# Patient Record
Sex: FEMALE | Race: WHITE | Employment: OTHER | URBAN - METROPOLITAN AREA
[De-identification: names, ages, dates, MRNs, and addresses within clinical notes are randomized per-mention and may not be internally consistent; named-entity substitution may affect disease eponyms.]

---

## 2017-01-02 ENCOUNTER — ONCOLOGY VISIT (OUTPATIENT)
Dept: ONCOLOGY | Facility: CLINIC | Age: 72
End: 2017-01-02
Attending: INTERNAL MEDICINE
Payer: MEDICARE

## 2017-01-02 VITALS
OXYGEN SATURATION: 100 % | DIASTOLIC BLOOD PRESSURE: 73 MMHG | TEMPERATURE: 97 F | SYSTOLIC BLOOD PRESSURE: 167 MMHG | WEIGHT: 113 LBS | RESPIRATION RATE: 16 BRPM | HEIGHT: 65 IN | HEART RATE: 74 BPM | BODY MASS INDEX: 18.83 KG/M2

## 2017-01-02 DIAGNOSIS — C90.00 MULTIPLE MYELOMA, REMISSION STATUS UNSPECIFIED (H): Primary | ICD-10-CM

## 2017-01-02 DIAGNOSIS — D47.2 MONOCLONAL GAMMOPATHY: ICD-10-CM

## 2017-01-02 PROCEDURE — 99211 OFF/OP EST MAY X REQ PHY/QHP: CPT

## 2017-01-02 PROCEDURE — 99214 OFFICE O/P EST MOD 30 MIN: CPT | Performed by: INTERNAL MEDICINE

## 2017-01-02 ASSESSMENT — PAIN SCALES - GENERAL: PAINLEVEL: NO PAIN (0)

## 2017-01-02 NOTE — PROGRESS NOTES
"Roxanna Castorena is a 71 year old female who presents for:  Chief Complaint   Patient presents with     Oncology Clinic Visit     follow up        Initial Vitals:  There were no vitals taken for this visit. Estimated body mass index is 17.35 kg/(m^2) as calculated from the following:    Height as of 7/11/16: 1.657 m (5' 5.24\").    Weight as of 11/18/16: 47.628 kg (105 lb).. There is no height or weight on file to calculate BSA. BP completed using cuff size: regular  Data Unavailable No LMP recorded. Patient has had a hysterectomy. Allergies and medications reviewed.     Medications: Medication refills not needed today.  Pharmacy [facility] entered into [email or web address]: [facility] [address] [place] [address] [address] AT [place] [address] & [facility]    Comments: Follow up    8 minutes for nursing intake (face to face time)   Amy Shepard CMA     DISCHARGE PLAN:  Next appointments: See patient instruction section  Departure Mode: Ambulatory  Accompanied by:   0 minutes for nursing discharge (face to face time)   Amy Shepard CMA              "

## 2017-01-02 NOTE — PROGRESS NOTES
"[facility] PHYSICIANS  HEMATOLOGY ONCOLOGY    DIAGNOSES:   1.  Monoclonal gammopathy of unknown significance.  Bone marrow biopsy 10/09/2014 with less than 5% plasma cells with a portion that are polytypic for kappa and lambda, majority stain for lambda, mildly hypocellular marrow with trilineage hematopoiesis, no increase in blasts, overall consistent with subpopulation of lambda-restricted plasma cells in a polyclonal background.   2.  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 She has been at her baseline, continues to have memory issues.     REVIEW OF SYSTEMS:  A complete review of systems was performed and found to be negative other than pertinent positives mentioned in the history of present illness.      Past medical, social histories reviewed.    Meds- Reviewed.    PHYSICAL EXAM:   /73 mmHg  Pulse 74  Temp(Src) 97  F (36.1  C) (Oral)  Resp 16  Ht 1.657 m (5' 5.24\")  Wt 51.256 kg (113 lb)  BMI 18.67 kg/m2  SpO2 100%  GENERAL:  Sitting comfortably.   HEENT:  Pupils equal.  Oral mucosa normal.   NECK:  No supraclavicular, submandibular or cervical lymphadenopathy.   HEART:  S1, S2, regular.   LUNGS:  Bilaterally clear to auscultation.   ABDOMEN:  Soft, nontender.  No hepatosplenomegaly.   EXTREMITIES:  No dependent edema.   SKIN:  No rash.   NEUROLOGIC:  Alert, awake, with a component of cognitive impairment.        DATA:  Recent Labs   Lab Test  12/30/16   1345  07/06/16   1315   NA  145*  140   POTASSIUM  4.0  4.0   CHLORIDE  107  105   CO2  30  28   ANIONGAP  8  7   BUN  25  25   CR  0.94  0.96   GLC  77  99   PRINCESS  9.0  8.5     Recent Labs   Lab Test  12/30/16   1345  09/16/16   1131  07/06/16   1315   WBC  5.0  6.0  4.2   HGB  11.8  10.8*  10.2*   PLT  154  145*  132*   MCV  109*  110*  110*   NEUTROPHIL  66.1  73.5  67.3     Recent Labs   Lab " Test  12/30/16   1345  07/06/16   1315  01/18/16   1249   BILITOTAL  0.5  0.6  0.5   ALKPHOS  83  64  79   ALT  15  9  13   AST  24  13  18   ALBUMIN  3.8  3.5  4.1     Results for SAUL ESPARZA (MRN [id number]) as of 1/2/2017 15:03   Ref. Range 7/6/2016 13:15 12/30/2016 13:45   Gamma Fraction Latest Ref Range: 0.7-1.6 g/dL 0.8 0.8   IGA Latest Ref Range:  mg/dL 144 Pending   IGG Latest Ref Range: 695-1620 mg/dL 921 Pending   IGM Latest Ref Range:  mg/dL 53 (L) Pending   Immunofixation ELP Unknown (Note)... (Note)...   Kappa Free Lt Chain Latest Ref Range: 0.33-1.94 mg/dL 1.21    Kappa Lambda Ratio Latest Ref Range: 0.26-1.65  0.64    Lambda Free Lt Chain Latest Ref Range: 0.57-2.63 mg/dL 1.88    Monoclonal Peak Latest Ref Range: 0.0 g/dL 0.3 (H) 0.3 (H)       Results for orders placed or performed during the hospital encounter of 12/30/16   XR Bone Survey Complete    Narrative    XR BONE SURVEY COMPLETE  12/30/2016 1:30 PM    HISTORY:  Monoclonal gammopathy    COMPARISON:  1/18/2016      Impression    IMPRESSION:  Possible tiny lytic lesion in the skull. 3.5 cm x 1.5  lucent lesion in the left intertrochanteric may be lytic. Possible  small lytic lesions in the proximal humerus, not all confirmed on  every view. Otherwise negative. Findings could reflect multiple  myeloma.    JONATHAN GIRALDO MD       ECOG performance status 1.      ASSESSMENT:     This is a 69-year-old lady who has a monoclonal gammopathy of unknown significance without any evidence of end organ damage on initial evaluation. Bone marrow biopsy showed no evidence of myeloma.  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 Will continue close observation.  - Bone survey 12/30/16 is showing new bone lesion which may represent progression of MGUS to multiple myeloma.   - Paraproteinemia labs are pending at this point except for stable M spike of 0.3 grams. Otherwise, normal kidney function, calcium level and  stable counts.   - I think further evaluation of skeletal lesions is a reasonable approach. I will have her get a PET CT scan.      PLAN:    1- RTC MD 6 months   2- Labs 3-4 days prior to next visit- CBC diff, CMP  SPEP, Serum IFXN. Light chain assay  3- PET scan in next 1-2 weeks  RANDI MENDES MD    1/2/2017

## 2017-01-02 NOTE — PATIENT INSTRUCTIONS
1- RTC MD 6 months - July 17th @ 1:00/Amanda  2- Labs 3-4 days prior to next visit- CBC diff, CMP  SPEP, Serum IFXN. Light chain assay- July 12th @ 1:00/Amanda  3- PET scan in next 1-2 weeks- Scheduled for Jan 9th @ 1:15/[facility].  Instruction for test and directions to facility given.  Amanda    AVS printed for pt.  Amanda

## 2017-01-14 ENCOUNTER — HOSPITAL ENCOUNTER (OUTPATIENT)
Dept: PET IMAGING | Facility: CLINIC | Age: 72
Discharge: HOME | End: 2017-01-14
Attending: INTERNAL MEDICINE | Admitting: INTERNAL MEDICINE
Payer: MEDICARE

## 2017-01-14 DIAGNOSIS — C90.00 MULTIPLE MYELOMA, REMISSION STATUS UNSPECIFIED (H): ICD-10-CM

## 2017-01-14 LAB — GLUCOSE BLDC GLUCOMTR-MCNC: 126 MG/DL (ref 70–99)

## 2017-01-14 PROCEDURE — 25500064 ZZH RX 255 OP 636: Performed by: INTERNAL MEDICINE

## 2017-01-14 PROCEDURE — A9552 F18 FDG: HCPCS | Performed by: INTERNAL MEDICINE

## 2017-01-14 PROCEDURE — 71260 CT THORAX DX C+: CPT

## 2017-01-14 PROCEDURE — 34300033 ZZH RX 343: Performed by: INTERNAL MEDICINE

## 2017-01-14 PROCEDURE — 74177 CT ABD & PELVIS W/CONTRAST: CPT

## 2017-01-14 PROCEDURE — 82962 GLUCOSE BLOOD TEST: CPT

## 2017-01-14 RX ORDER — IOPAMIDOL 755 MG/ML
20-135 INJECTION, SOLUTION INTRAVASCULAR ONCE
Status: COMPLETED | OUTPATIENT
Start: 2017-01-14 | End: 2017-01-14

## 2017-01-14 RX ADMIN — IOPAMIDOL 61 ML: 755 INJECTION, SOLUTION INTRAVENOUS at 12:00

## 2017-01-14 RX ADMIN — FLUDEOXYGLUCOSE F-18 10.24 MCI.: 500 INJECTION, SOLUTION INTRAVENOUS at 12:00

## 2017-02-08 ENCOUNTER — TRANSFERRED RECORDS (OUTPATIENT)
Dept: HEALTH INFORMATION MANAGEMENT | Facility: CLINIC | Age: 72
End: 2017-02-08

## 2017-05-16 ENCOUNTER — TELEPHONE (OUTPATIENT)
Dept: NURSING | Facility: CLINIC | Age: 72
End: 2017-05-16

## 2017-05-16 ENCOUNTER — OFFICE VISIT (OUTPATIENT)
Dept: INTERNAL MEDICINE | Facility: CLINIC | Age: 72
End: 2017-05-16
Payer: MEDICARE

## 2017-05-16 VITALS
HEIGHT: 65 IN | BODY MASS INDEX: 19.04 KG/M2 | TEMPERATURE: 97.5 F | WEIGHT: 114.3 LBS | DIASTOLIC BLOOD PRESSURE: 80 MMHG | OXYGEN SATURATION: 100 % | SYSTOLIC BLOOD PRESSURE: 140 MMHG | HEART RATE: 59 BPM

## 2017-05-16 DIAGNOSIS — M25.473 ANKLE EDEMA: Primary | ICD-10-CM

## 2017-05-16 LAB
ALBUMIN SERPL-MCNC: 3.5 G/DL (ref 3.4–5)
ALP SERPL-CCNC: 69 U/L (ref 40–150)
ALT SERPL W P-5'-P-CCNC: 19 U/L (ref 0–50)
ANION GAP SERPL CALCULATED.3IONS-SCNC: 7 MMOL/L (ref 3–14)
AST SERPL W P-5'-P-CCNC: 15 U/L (ref 0–45)
BILIRUB SERPL-MCNC: 0.5 MG/DL (ref 0.2–1.3)
BUN SERPL-MCNC: 29 MG/DL (ref 7–30)
CALCIUM SERPL-MCNC: 9 MG/DL (ref 8.5–10.1)
CHLORIDE SERPL-SCNC: 112 MMOL/L (ref 94–109)
CO2 SERPL-SCNC: 27 MMOL/L (ref 20–32)
CREAT SERPL-MCNC: 1.07 MG/DL (ref 0.52–1.04)
ERYTHROCYTE [DISTWIDTH] IN BLOOD BY AUTOMATED COUNT: 12.8 % (ref 10–15)
GFR SERPL CREATININE-BSD FRML MDRD: 50 ML/MIN/1.7M2
GLUCOSE SERPL-MCNC: 94 MG/DL (ref 70–99)
HCT VFR BLD AUTO: 34 % (ref 35–47)
HGB BLD-MCNC: 11 G/DL (ref 11.7–15.7)
MCH RBC QN AUTO: 35.1 PG (ref 26.5–33)
MCHC RBC AUTO-ENTMCNC: 32.4 G/DL (ref 31.5–36.5)
MCV RBC AUTO: 109 FL (ref 78–100)
PLATELET # BLD AUTO: 148 10E9/L (ref 150–450)
POTASSIUM SERPL-SCNC: 4.3 MMOL/L (ref 3.4–5.3)
PROT SERPL-MCNC: 6.7 G/DL (ref 6.8–8.8)
RBC # BLD AUTO: 3.13 10E12/L (ref 3.8–5.2)
SODIUM SERPL-SCNC: 146 MMOL/L (ref 133–144)
WBC # BLD AUTO: 5 10E9/L (ref 4–11)

## 2017-05-16 PROCEDURE — 80053 COMPREHEN METABOLIC PANEL: CPT | Performed by: INTERNAL MEDICINE

## 2017-05-16 PROCEDURE — 85027 COMPLETE CBC AUTOMATED: CPT | Performed by: INTERNAL MEDICINE

## 2017-05-16 PROCEDURE — 99214 OFFICE O/P EST MOD 30 MIN: CPT | Performed by: INTERNAL MEDICINE

## 2017-05-16 PROCEDURE — 36415 COLL VENOUS BLD VENIPUNCTURE: CPT | Performed by: INTERNAL MEDICINE

## 2017-05-16 NOTE — TELEPHONE ENCOUNTER
Patient's spouse calling.  Reporting over the last week patient has developed mild swelling in bilateral ankles.  Denies pain.  Swelling has not inhibited patients ability to ambulate.  Denies any difficulty breathing.  No recent medication changes.  Requesting to be seen, made appointment for today 5/16/17.

## 2017-05-16 NOTE — LETTER
[address] [id number] [phone]      05/17/17      Roxanna Castorena  [address] [place] [address]        Dear ,    It was a pleasure meeting you the other day! I hope this finds you well.    I wanted to let you know that your labs came back as stable from prior labs - no significant change in kidney function or blood counts.    Your leg ultrasound was negative for blood clots. It did show small, benign cysts behind both knees. These are nothing to worry about (and there's nothing to do for them).    Your mild ankle swelling is likely due to venous insufficiency - which is a benign condition (slowed venous blood return to heart from legs). This is best treated with leg elevation when sitting or lying down.    If your swelling worsens, please let me or Dr. Jeffrey know.     Please feel free to contact me with any questions or concerns.      Take care,    Ros Garza MD   [address] [address]  T: [phone], F: [phone]

## 2017-05-16 NOTE — MR AVS SNAPSHOT
After Visit Summary   5/16/2017    Roxanna Castorena    MRN: [id number]           Patient Information     Date Of Birth          1945        Visit Information        Provider Department      5/16/2017 2:30 PM Ros Garza MD [facility]        Today's Diagnoses     Bilateral lower extremity edema    -  1      Care Instructions    Please schedule leg ultrasounds on the way out.     ---    Labs - please proceed to our first floor laboratory to have these drawn (show them your orange ticket).     Results:    If normal: we will release results in MyChart or send them in the mail. You will not be called for these results.    If abnormal, but non-urgent: we will release results in MyChart or send them in the mail. You will not be called for these results.    If abnormal and urgent: we will call you.    ---    Keep legs elevated when seated and lying down.     ---              Follow-ups after your visit        Your next 10 appointments already scheduled     Jul 11, 2017  9:00 AM CDT   Return Visit with  ONCOLOGY NURSE   [facility] ([facility])    [facility]  [address]  [facility] [address]   [phone]            Jul 17, 2017  1:00 PM CDT   Return Visit with Amber Villagran MD   [facility] ([facility])    [facility]  [address]  [facility] [address]   [phone]              Future tests that were ordered for you today     Open Future Orders        Priority Expected Expires Ordered    US Lower Extremity Venous Duplex Bilateral Routine  5/16/2018 5/16/2017            Who to contact     If you have questions or need follow up information about today's clinic visit or your schedule please contact [facility] directly at [phone].  Normal or non-critical lab and imaging results will be  "communicated to you by MyChart, letter or phone within 4 business days after the clinic has received the results. If you do not hear from us within 7 days, please contact the clinic through Qwicklyt or phone. If you have a critical or abnormal lab result, we will notify you by phone as soon as possible.  Submit refill requests through Pockit or call your pharmacy and they will forward the refill request to us. Please allow 3 business days for your refill to be completed.          Additional Information About Your Visit        Pockit Information     Pockit lets you send messages to your doctor, view your test results, renew your prescriptions, schedule appointments and more. To sign up, go to [email or web address] . Click on \"Log in\" on the left side of the screen, which will take you to the Welcome page. Then click on \"Sign up Now\" on the right side of the page.     You will be asked to enter the access code listed below, as well as some personal information. Please follow the directions to create your username and password.     Your access code is: [email or web address]  Expires: 2017  2:52 PM     Your access code will  in 90 days. If you need help or a new code, please call your [place] clinic or [phone].        Care EveryWhere ID     This is your Care EveryWhere ID. This could be used by other organizations to access your [place] medical records  [id number]        Your Vitals Were     Pulse Temperature Height Pulse Oximetry BMI (Body Mass Index)       59 97.5  F (36.4  C) (Oral) 5' 5\" (1.651 m) 100% 19.02 kg/m2        Blood Pressure from Last 3 Encounters:   17 140/80   17 167/73   16 130/72    Weight from Last 3 Encounters:   17 114 lb 4.8 oz (51.8 kg)   17 113 lb (51.3 kg)   16 105 lb (47.6 kg)              We Performed the Following     CBC with platelets     Comprehensive metabolic panel        Primary Care Provider Office Phone # Fax #    Lui Jeffrey MD " [phone]       [facility] [address]  [facility] [address]        Thank you!     Thank you for choosing [facility]  for your care. Our goal is always to provide you with excellent care. Hearing back from our patients is one way we can continue to improve our services. Please take a few minutes to complete the written survey that you may receive in the mail after your visit with us. Thank you!             Your Updated Medication List - Protect others around you: Learn how to safely use, store and throw away your medicines at www.disposemymeds.org.          This list is accurate as of: 5/16/17  2:52 PM.  Always use your most recent med list.                   Brand Name Dispense Instructions for use    carbidopa-levodopa  MG per tablet    SINEMET     Take 1 tablet by mouth 3 times daily       EXELON 4.6 MG/24HR 24 hr patch   Generic drug:  rivastigmine      Place 1 patch onto the skin daily Prescribed by Neurology       gabapentin 100 MG capsule    NEURONTIN    90 capsule    Take 1 capsule (100 mg) by mouth 3 times daily As needed for  Facial nerve pain       lisinopril 10 MG tablet    PRINIVIL/ZESTRIL    90 tablet    Take 1 tablet (10 mg) by mouth daily       memantine 10 MG tablet    NAMENDA    180 tablet    Take 1 tablet (10 mg) by mouth 2 times daily       propranolol 10 MG tablet    INDERAL    180 tablet    Take 1 tablet (10 mg) by mouth 2 times daily

## 2017-05-16 NOTE — NURSING NOTE
"Chief Complaint   Patient presents with     Swelling     x 2 weeks. Both ankles.       Initial /80 (BP Location: Left arm, Patient Position: Chair, Cuff Size: Adult Regular)  Pulse 59  Temp 97.5  F (36.4  C) (Oral)  Ht 5' 5\" (1.651 m)  Wt 114 lb 4.8 oz (51.8 kg)  SpO2 100%  BMI 19.02 kg/m2 Estimated body mass index is 19.02 kg/(m^2) as calculated from the following:    Height as of this encounter: 5' 5\" (1.651 m).    Weight as of this encounter: 114 lb 4.8 oz (51.8 kg).  Medication Reconciliation: complete       Kaminibose MA      "

## 2017-05-16 NOTE — PATIENT INSTRUCTIONS
Please schedule leg ultrasounds on the way out.     ---    Labs - please proceed to our first floor laboratory to have these drawn (show them your orange ticket).     Results:    If normal: we will release results in MyChart or send them in the mail. You will not be called for these results.    If abnormal, but non-urgent: we will release results in MyChart or send them in the mail. You will not be called for these results.    If abnormal and urgent: we will call you.    ---    Keep legs elevated when seated and lying down.     ---

## 2017-05-16 NOTE — PROGRESS NOTES
"  SUBJECTIVE:                                                      HPI: Roxanna Castorena is a pleasant 71 year old female who presents with ankle swelling:    Accompanied by , who provides the majority of history (patient has Alzheimer's).    - ongoing for ~1 month  - bilateral, L>R    - present all day, even upon waking  - does not worsen over the course of the day    - no associated discomfort or skin changes  - no chest pain or palpitations  - no shortness of breath or cough  - no lightheadedness or presyncope  - no fevers or chills    - no recent trauma, surgeries, or mobilization; patient is moderately active during the day (not sedentary)  - no new medications prescription or over-the-counter    PMH significant for CKD stage III multiple myeloma.    The medication, allergy, and problem lists have been reviewed and updated as appropriate.       OBJECTIVE:                                                      /80 (BP Location: Left arm, Patient Position: Chair, Cuff Size: Adult Regular)  Pulse 59  Temp 97.5  F (36.4  C) (Oral)  Ht 5' 5\" (1.651 m)  Wt 114 lb 4.8 oz (51.8 kg)  SpO2 100%  BMI 19.02 kg/m2  Constitutional: well-appearing  Respiratory: poor respiratory effort; clear to auscultation bilaterally  Cardiovascular: regular rate and rhythm; mild, nonpitting edema bilaterally, L>R  Musculoskeletal: normal gait and station  Psych: flattened affect; minimally but appropriately interactive       ASSESSMENT/PLAN:                                                      (M24.398) Ankle edema  (primary encounter diagnosis)  Comment: suspect mild venous insufficiency, but worsening renal function (possibly due to MM) is also on differential.  Plan:    - acute feet elevated when seated or lying down.    - CBC and CMP today.   - bilateral lower extremity venous ultrasound to be scheduled.   - if above unrevealing, do not think compression hose necessary (edema is mild and asymptomatic).    The " instructions on the AVS were discussed and explained to the patient. Patient expressed understanding of instructions.    Ros Garza MD   [address] [address]  T: [phone], F: [phone]

## 2017-05-17 ENCOUNTER — TELEPHONE (OUTPATIENT)
Dept: INTERNAL MEDICINE | Facility: CLINIC | Age: 72
End: 2017-05-17

## 2017-05-17 ENCOUNTER — RADIANT APPOINTMENT (OUTPATIENT)
Dept: ULTRASOUND IMAGING | Facility: CLINIC | Age: 72
End: 2017-05-17
Attending: INTERNAL MEDICINE
Payer: MEDICARE

## 2017-05-17 DIAGNOSIS — M25.473 ANKLE EDEMA: ICD-10-CM

## 2017-05-17 PROCEDURE — 93970 EXTREMITY STUDY: CPT

## 2017-07-10 ENCOUNTER — ONCOLOGY VISIT (OUTPATIENT)
Dept: ONCOLOGY | Facility: CLINIC | Age: 72
End: 2017-07-10
Attending: INTERNAL MEDICINE
Payer: MEDICARE

## 2017-07-10 ENCOUNTER — HOSPITAL ENCOUNTER (OUTPATIENT)
Facility: CLINIC | Age: 72
Setting detail: SPECIMEN
Discharge: HOME | End: 2017-07-10
Attending: INTERNAL MEDICINE | Admitting: INTERNAL MEDICINE
Payer: MEDICARE

## 2017-07-10 DIAGNOSIS — D47.2 MONOCLONAL GAMMOPATHY: ICD-10-CM

## 2017-07-10 LAB
ALBUMIN SERPL-MCNC: 3.5 G/DL (ref 3.4–5)
ALP SERPL-CCNC: 101 U/L (ref 40–150)
ALT SERPL W P-5'-P-CCNC: 20 U/L (ref 0–50)
ANION GAP SERPL CALCULATED.3IONS-SCNC: 6 MMOL/L (ref 3–14)
AST SERPL W P-5'-P-CCNC: 21 U/L (ref 0–45)
BASOPHILS # BLD AUTO: 0 10E9/L (ref 0–0.2)
BASOPHILS NFR BLD AUTO: 0.5 %
BILIRUB SERPL-MCNC: 0.5 MG/DL (ref 0.2–1.3)
BUN SERPL-MCNC: 24 MG/DL (ref 7–30)
CALCIUM SERPL-MCNC: 8.9 MG/DL (ref 8.5–10.1)
CHLORIDE SERPL-SCNC: 109 MMOL/L (ref 94–109)
CO2 SERPL-SCNC: 29 MMOL/L (ref 20–32)
CREAT SERPL-MCNC: 1.03 MG/DL (ref 0.52–1.04)
DIFFERENTIAL METHOD BLD: ABNORMAL
EOSINOPHIL # BLD AUTO: 0 10E9/L (ref 0–0.7)
EOSINOPHIL NFR BLD AUTO: 1 %
ERYTHROCYTE [DISTWIDTH] IN BLOOD BY AUTOMATED COUNT: 13.2 % (ref 10–15)
GFR SERPL CREATININE-BSD FRML MDRD: 52 ML/MIN/1.7M2
GLUCOSE SERPL-MCNC: 119 MG/DL (ref 70–99)
HCT VFR BLD AUTO: 34 % (ref 35–47)
HGB BLD-MCNC: 11.3 G/DL (ref 11.7–15.7)
IMM GRANULOCYTES # BLD: 0 10E9/L (ref 0–0.4)
IMM GRANULOCYTES NFR BLD: 0.2 %
LYMPHOCYTES # BLD AUTO: 0.8 10E9/L (ref 0.8–5.3)
LYMPHOCYTES NFR BLD AUTO: 19.9 %
MCH RBC QN AUTO: 36 PG (ref 26.5–33)
MCHC RBC AUTO-ENTMCNC: 33.2 G/DL (ref 31.5–36.5)
MCV RBC AUTO: 108 FL (ref 78–100)
MONOCYTES # BLD AUTO: 0.3 10E9/L (ref 0–1.3)
MONOCYTES NFR BLD AUTO: 6.5 %
NEUTROPHILS # BLD AUTO: 2.9 10E9/L (ref 1.6–8.3)
NEUTROPHILS NFR BLD AUTO: 71.9 %
NRBC # BLD AUTO: 0 10*3/UL
NRBC BLD AUTO-RTO: 0 /100
PLATELET # BLD AUTO: 150 10E9/L (ref 150–450)
POTASSIUM SERPL-SCNC: 3.7 MMOL/L (ref 3.4–5.3)
PROT SERPL-MCNC: 6.7 G/DL (ref 6.8–8.8)
RBC # BLD AUTO: 3.14 10E12/L (ref 3.8–5.2)
SODIUM SERPL-SCNC: 144 MMOL/L (ref 133–144)
WBC # BLD AUTO: 4 10E9/L (ref 4–11)

## 2017-07-10 PROCEDURE — 83883 ASSAY NEPHELOMETRY NOT SPEC: CPT | Performed by: INTERNAL MEDICINE

## 2017-07-10 PROCEDURE — 00000402 ZZHCL STATISTIC TOTAL PROTEIN: Performed by: INTERNAL MEDICINE

## 2017-07-10 PROCEDURE — 84165 PROTEIN E-PHORESIS SERUM: CPT | Performed by: INTERNAL MEDICINE

## 2017-07-10 PROCEDURE — 82784 ASSAY IGA/IGD/IGG/IGM EACH: CPT | Performed by: INTERNAL MEDICINE

## 2017-07-10 PROCEDURE — 85025 COMPLETE CBC W/AUTO DIFF WBC: CPT | Performed by: INTERNAL MEDICINE

## 2017-07-10 PROCEDURE — 80053 COMPREHEN METABOLIC PANEL: CPT | Performed by: INTERNAL MEDICINE

## 2017-07-10 PROCEDURE — 36415 COLL VENOUS BLD VENIPUNCTURE: CPT

## 2017-07-10 PROCEDURE — 86334 IMMUNOFIX E-PHORESIS SERUM: CPT | Performed by: INTERNAL MEDICINE

## 2017-07-10 NOTE — MR AVS SNAPSHOT
"              After Visit Summary   7/10/2017    Roxanna Castorena    MRN: [id number]           Patient Information     Date Of Birth          1945        Visit Information        Provider Department      7/10/2017 9:00 AM  ONCOLOGY NURSE [facility]        Today's Diagnoses     Monoclonal gammopathy           Follow-ups after your visit        Your next 10 appointments already scheduled     Jul 17, 2017  1:00 PM CDT   Return Visit with Amber Villagran MD   [facility] ([facility])    [facility]  [address]  [facility] [id number] [phone]              Who to contact     If you have questions or need follow up information about today's clinic visit or your schedule please contact [facility] directly at [phone].  Normal or non-critical lab and imaging results will be communicated to you by ugichemhart, letter or phone within 4 business days after the clinic has received the results. If you do not hear from us within 7 days, please contact the clinic through MyChart or phone. If you have a critical or abnormal lab result, we will notify you by phone as soon as possible.  Submit refill requests through 8aweek or call your pharmacy and they will forward the refill request to us. Please allow 3 business days for your refill to be completed.          Additional Information About Your Visit        MyChart Information     8aweek lets you send messages to your doctor, view your test results, renew your prescriptions, schedule appointments and more. To sign up, go to [email or web address] . Click on \"Log in\" on the left side of the screen, which will take you to the Welcome page. Then click on \"Sign up Now\" on the right side of the page.     You will be asked to enter the access code listed below, as well as some personal information. Please follow the directions to create your username " and password.     Your access code is: [email or web address]  Expires: 2017  2:52 PM     Your access code will  in 90 days. If you need help or a new code, please call your [place] clinic or [phone].        Care EveryWhere ID     This is your Care EveryWhere ID. This could be used by other organizations to access your [place] medical records  [id number]         Blood Pressure from Last 3 Encounters:   17 140/80   17 167/73   16 130/72    Weight from Last 3 Encounters:   17 51.8 kg (114 lb 4.8 oz)   17 51.3 kg (113 lb)   16 47.6 kg (105 lb)              We Performed the Following     CBC with platelets differential     Comprehensive metabolic panel     Kappa and lambda light chain     Protein electrophoresis     Protein Immunofixation Serum        Primary Care Provider Office Phone # Fax #    Lui Jeffrey [phone]       [facility] [address] [facility] [address]        Equal Access to Services     MAGO RODRIGUEZ : Hadii aad ku hadasho Soomaali, waaxda luqadaha, qaybta kaalmada adekymberlyyaashley, claudia hector. So [facility] [phone].    ATENCIÓN: Si habla español, tiene a nicole disposición servicios gratuitos de asistencia lingüística. Fawad al [phone].    We comply with applicable federal civil rights laws and [place] laws. We do not discriminate on the basis of race, color, national origin, age, disability sex, sexual orientation or gender identity.            Thank you!     Thank you for choosing [facility]  for your care. Our goal is always to provide you with excellent care. Hearing back from our patients is one way we can continue to improve our services. Please take a few minutes to complete the written survey that you may receive in the mail after your visit with us. Thank you!             Your Updated Medication List - Protect others around you: Learn how to safely use, store and  throw away your medicines at www.disposemymeds.org.          This list is accurate as of: 7/10/17  9:27 AM.  Always use your most recent med list.                   Brand Name Dispense Instructions for use Diagnosis    carbidopa-levodopa  MG per tablet    SINEMET     Take 1 tablet by mouth 3 times daily        EXELON 4.6 MG/24HR 24 hr patch   Generic drug:  rivastigmine      Place 1 patch onto the skin daily Prescribed by Neurology    Dementia       gabapentin 100 MG capsule    NEURONTIN    90 capsule    Take 1 capsule (100 mg) by mouth 3 times daily As needed for  Facial nerve pain    Trigeminal neuralgia       lisinopril 10 MG tablet    PRINIVIL/ZESTRIL    90 tablet    Take 1 tablet (10 mg) by mouth daily    Benign essential hypertension       memantine 10 MG tablet    NAMENDA    180 tablet    Take 1 tablet (10 mg) by mouth 2 times daily    Dementia without behavioral disturbance, unspecified dementia type       propranolol 10 MG tablet    INDERAL    180 tablet    Take 1 tablet (10 mg) by mouth 2 times daily    Tremor

## 2017-07-10 NOTE — PROGRESS NOTES
Medical Assistant Note:  Roxanna Castorena presents today for lab draw.    Patient seen by provider today: No.   present during visit today: Not Applicable.    Concerns: No Concerns.    Procedure:  Labs drawn: .    Post Assessment:  Labs drawn without difficulty: Yes.    Discharge Plan:  Departure Mode: Ambulatory.    Face to Face Time: 10.    Amy Shepard

## 2017-07-11 LAB
ALBUMIN SERPL ELPH-MCNC: 3.7 G/DL (ref 3.7–5.1)
ALPHA1 GLOB SERPL ELPH-MCNC: 0.4 G/DL (ref 0.2–0.4)
ALPHA2 GLOB SERPL ELPH-MCNC: 0.8 G/DL (ref 0.5–0.9)
B-GLOBULIN SERPL ELPH-MCNC: 0.8 G/DL (ref 0.6–1)
GAMMA GLOB SERPL ELPH-MCNC: 0.8 G/DL (ref 0.7–1.6)
IGA SERPL-MCNC: 131 MG/DL (ref 70–380)
IGG SERPL-MCNC: 809 MG/DL (ref 695–1620)
IGM SERPL-MCNC: 49 MG/DL (ref 60–265)
IMMUNOFIXATION ELP: ABNORMAL
KAPPA LC UR-MCNC: 1.24 MG/DL (ref 0.33–1.94)
KAPPA LC/LAMBDA SER: 0.52 {RATIO} (ref 0.26–1.65)
LAMBDA LC SERPL-MCNC: 2.4 MG/DL (ref 0.57–2.63)
M PROTEIN SERPL ELPH-MCNC: 0.3 G/DL
PROT PATTERN SERPL ELPH-IMP: ABNORMAL

## 2017-07-17 ENCOUNTER — ONCOLOGY VISIT (OUTPATIENT)
Dept: ONCOLOGY | Facility: CLINIC | Age: 72
End: 2017-07-17
Attending: INTERNAL MEDICINE
Payer: MEDICARE

## 2017-07-17 VITALS
WEIGHT: 118 LBS | OXYGEN SATURATION: 99 % | HEART RATE: 71 BPM | DIASTOLIC BLOOD PRESSURE: 71 MMHG | TEMPERATURE: 96.6 F | SYSTOLIC BLOOD PRESSURE: 153 MMHG | HEIGHT: 65 IN | BODY MASS INDEX: 19.66 KG/M2 | RESPIRATION RATE: 16 BRPM

## 2017-07-17 DIAGNOSIS — D47.2 MGUS (MONOCLONAL GAMMOPATHY OF UNKNOWN SIGNIFICANCE): Primary | ICD-10-CM

## 2017-07-17 PROCEDURE — 99211 OFF/OP EST MAY X REQ PHY/QHP: CPT

## 2017-07-17 PROCEDURE — 99213 OFFICE O/P EST LOW 20 MIN: CPT | Performed by: INTERNAL MEDICINE

## 2017-07-17 ASSESSMENT — PAIN SCALES - GENERAL: PAINLEVEL: NO PAIN (0)

## 2017-07-17 NOTE — MR AVS SNAPSHOT
After Visit Summary   7/17/2017    Roxanna Castorena    MRN: [id number]           Patient Information     Date Of Birth          1945        Visit Information        Provider Department      7/17/2017 1:00 PM Amber Villagran MD [facility]  Oncology [facility]      Today's Diagnoses     MGUS (monoclonal gammopathy of unknown significance)    -  1      Care Instructions    1- RTC MD 6 months Scheduled  Marcia MILLER  2- Labs 3-4 days prior to next visit- CBC diff, CMP  SPEP, Serum IFXN. Light chain assay Scheduled  Marcia MILLER    AVS given to patient          Follow-ups after your visit        Your next 10 appointments already scheduled     Jan 08, 2018  1:00 PM CST   Return Visit with  ONCOLOGY NURSE   [facility] ([facility])    [facility]  [address]  [facility] [id number] [phone]            Marquis 15, 2018  2:00 PM CST   Return Visit with Amber Villagran MD   [facility] ([facility])    [facility]  [address]  [facility] [address] [phone]              Future tests that were ordered for you today     Open Future Orders        Priority Expected Expires Ordered    Protein electrophoresis Routine 1/17/2018 7/17/2018 7/17/2017    Kappa and lambda light chain Routine 1/17/2018 7/17/2018 7/17/2017    Comprehensive metabolic panel Routine 1/17/2018 7/17/2018 7/17/2017    CBC with platelets differential Routine 1/17/2018 7/17/2018 7/17/2017    Protein Immunofixation Serum Routine 1/17/2018 7/17/2018 7/17/2017            Who to contact     If you have questions or need follow up information about today's clinic visit or your schedule please contact [facility] directly at [phone].  Normal or non-critical lab and imaging results will be communicated to you by MyChart, letter or phone within 4 business days  "after the clinic has received the results. If you do not hear from us within 7 days, please contact the clinic through [email or web address] or phone. If you have a critical or abnormal lab result, we will notify you by phone as soon as possible.  Submit refill requests through [email or web address] or call your pharmacy and they will forward the refill request to us. Please allow 3 business days for your refill to be completed.          Additional Information About Your Visit        [email or web address] Information     [email or web address] lets you send messages to your doctor, view your test results, renew your prescriptions, schedule appointments and more. To sign up, go to [email or web address] . Click on \"Log in\" on the left side of the screen, which will take you to the Welcome page. Then click on \"Sign up Now\" on the right side of the page.     You will be asked to enter the access code listed below, as well as some personal information. Please follow the directions to create your username and password.     Your access code is: [email or web address]  Expires: 2017  2:52 PM     Your access code will  in 90 days. If you need help or a new code, please call your [place] clinic or [phone].        Care EveryWhere ID     This is your Care EveryWhere ID. This could be used by other organizations to access your [place] medical records  [id number]        Your Vitals Were     Pulse Temperature Respirations Height Pulse Oximetry BMI (Body Mass Index)    71 96.6  F (35.9  C) (Oral) 16 1.651 m (5' 5\") 99% 19.64 kg/m2       Blood Pressure from Last 3 Encounters:   17 153/71   17 140/80   17 167/73    Weight from Last 3 Encounters:   17 53.5 kg (118 lb)   17 51.8 kg (114 lb 4.8 oz)   17 51.3 kg (113 lb)               Primary Care Provider Office Phone # Fax #    Lui Jeffrey [phone]       [facility] [address] [facility] [address]        Equal Access to Services     MAGO RODRIGUEZ AH: Lane petersen " palmer Jones, waaxda luqadaha, qaybta kaalmada rubén, claudia urbanoin hayaan avrilkymberly cooper ladavidqian tawny. So [facility] [place] [phone].    ATENCIÓN: Si matiasla pretty, tiene a nicole disposición servicios gratuitos de asistencia lingüística. Fawad al [phone].    We comply with applicable federal civil rights laws and [place] laws. We do not discriminate on the basis of race, color, national origin, age, disability sex, sexual orientation or gender identity.            Thank you!     Thank you for choosing [facility]  for your care. Our goal is always to provide you with excellent care. Hearing back from our patients is one way we can continue to improve our services. Please take a few minutes to complete the written survey that you may receive in the mail after your visit with us. Thank you!             Your Updated Medication List - Protect others around you: Learn how to safely use, store and throw away your medicines at www.disposemymeds.org.          This list is accurate as of: 7/17/17  1:31 PM.  Always use your most recent med list.                   Brand Name Dispense Instructions for use Diagnosis    carbidopa-levodopa  MG per tablet    SINEMET     Take 1 tablet by mouth 3 times daily        EXELON 4.6 MG/24HR 24 hr patch   Generic drug:  rivastigmine      Place 1 patch onto the skin daily Prescribed by Neurology    Dementia       gabapentin 100 MG capsule    NEURONTIN    90 capsule    Take 1 capsule (100 mg) by mouth 3 times daily As needed for  Facial nerve pain    Trigeminal neuralgia       lisinopril 10 MG tablet    PRINIVIL/ZESTRIL    90 tablet    Take 1 tablet (10 mg) by mouth daily    Benign essential hypertension       memantine 10 MG tablet    NAMENDA    180 tablet    Take 1 tablet (10 mg) by mouth 2 times daily    Dementia without behavioral disturbance, unspecified dementia type       propranolol 10 MG tablet    INDERAL    180 tablet    Take 1 tablet (10 mg) by mouth 2  times daily    Tremor

## 2017-07-17 NOTE — NURSING NOTE
"Oncology Rooming Note    July 17, 2017 1:04 PM   Roxanna Castorena is a 71 year old female who presents for:    Chief Complaint   Patient presents with     Oncology Clinic Visit     Follow up      Initial Vitals: /71 (BP Location: Right arm, Patient Position: Chair, Cuff Size: Adult Regular)  Pulse 71  Temp 96.6  F (35.9  C) (Oral)  Resp 16  Ht 1.651 m (5' 5\")  Wt 53.5 kg (118 lb)  SpO2 99%  BMI 19.64 kg/m2 Estimated body mass index is 19.64 kg/(m^2) as calculated from the following:    Height as of this encounter: 1.651 m (5' 5\").    Weight as of this encounter: 53.5 kg (118 lb). Body surface area is 1.57 meters squared.  No Pain (0) Comment: Data Unavailable   No LMP recorded. Patient has had a hysterectomy.  Allergies reviewed: Yes  Medications reviewed: Yes    Medications: Medication refills not needed today.  Pharmacy [facility] entered into Healthcare IT: [facility] [address] [place] [address] [address] AT [place] [address] & [facility]    Clinical concerns: Follow up     8 minutes for nursing intake (face to face time)     Becky Pompa CMA     DISCHARGE PLAN:  Next appointments: See patient instruction section  Departure Mode: Ambulatory  Accompanied by:   0 minutes for nursing discharge (face to face time)   Becky Pompa CMA                "

## 2017-07-17 NOTE — PROGRESS NOTES
"[facility] PHYSICIANS  HEMATOLOGY ONCOLOGY    DIAGNOSES:   1.  Monoclonal gammopathy of unknown significance.  Bone marrow biopsy 10/09/2014 with less than 5% plasma cells with a portion that are polytypic for kappa and lambda, majority stain for lambda, mildly hypocellular marrow with trilineage hematopoiesis, no increase in blasts, overall consistent with subpopulation of lambda-restricted plasma cells in a polyclonal background.   2.  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 She has been having issues with worsening dementia. No new complaints otherwise.      REVIEW OF SYSTEMS:  A complete review of systems was performed and found to be negative other than pertinent positives mentioned in the history of present illness.      Past medical, social histories reviewed.    Meds- Reviewed.    PHYSICAL EXAM:   /71 (BP Location: Right arm, Patient Position: Chair, Cuff Size: Adult Regular)  Pulse 71  Temp 96.6  F (35.9  C) (Oral)  Resp 16  Ht 1.651 m (5' 5\")  Wt 53.5 kg (118 lb)  SpO2 99%  BMI 19.64 kg/m2  CONSTITUTIONAL: Sitting comfortably.   HEENT: Pupils are equal. Oropharynx is clear.   NECK: No cervical or supraclavicular lymphadenopathy.   RESPIRATORY: Clear bilaterally.   CARD/VASC: S1, S2, regular.   GI: Soft, nontender, nondistended, no hepatosplenomegaly.   MUSKULOSKELETAL: Warm, well perfused.   NEUROLOGIC: Alert, awake.   INTEGUMENT: No rash.   LYMPHATICS: No edema.   PSYCH: Mood and affect was normal.      LABORATORY DATA AND IMAGING REVIEWED DURING THIS VISIT:  Recent Labs   Lab Test  07/10/17   0910  05/16/17   1502   NA  144  146*   POTASSIUM  3.7  4.3   CHLORIDE  109  112*   CO2  29  27   ANIONGAP  6  7   BUN  24  29   CR  1.03  1.07*   GLC  119*  94   PRINCESS  8.9  9.0     Recent Labs   Lab Test  07/10/17   0910  05/16/17   1502  12/30/16   " 1345  09/16/16   1131   WBC  4.0  5.0  5.0  6.0   HGB  11.3*  11.0*  11.8  10.8*   PLT  150  148*  154  145*   MCV  108*  109*  109*  110*   NEUTROPHIL  71.9   --   66.1  73.5     Recent Labs   Lab Test  07/10/17   0910  05/16/17   1502  12/30/16   1345   BILITOTAL  0.5  0.5  0.5   ALKPHOS  101  69  83   ALT  20  19  15   AST  21  15  24   ALBUMIN  3.5  3.5  3.8   Results for SAUL ESPARZA (MRN [id number]) as of 7/17/2017 13:24   Ref. Range 7/6/2016 13:15 12/30/2016 13:45 7/10/2017 09:10   Gamma Fraction Latest Ref Range: 0.7 - 1.6 g/dL 0.8 0.8 0.8   IGA Latest Ref Range: 70 - 380 mg/dL 144 149 131   IGG Latest Ref Range: 695 - 1620 mg/dL 921 1000 809   IGM Latest Ref Range: 60 - 265 mg/dL 53 (L) 59 (L) 49 (L)   Immunofixation ELP Unknown (Note)... (Note)... (Note)...   Kappa Free Lt Chain Latest Ref Range: 0.33 - 1.94 mg/dL 1.21 1.23 1.24   Kappa Lambda Ratio Latest Ref Range: 0.26 - 1.65  0.64 0.48 0.52   Lambda Free Lt Chain Latest Ref Range: 0.57 - 2.63 mg/dL 1.88 2.57 2.40   Monoclonal Peak Latest Ref Range: 0.0 g/dL 0.3 (H) 0.3 (H) 0.3 (H)     ECOG performance status 1.      ASSESSMENT:     This is a 69-year-old lady who has a monoclonal gammopathy of unknown significance without any evidence of end organ damage on initial evaluation. Bone marrow biopsy showed no evidence of myeloma.  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   Bone survey 12/30/16 is showing new bone lesion which may represent progression of MGUS to multiple myeloma. This was further assessed by a PET scan which was negative.   - Labs were reviewed with the patient, stable hemoglobin and M spike. We will continue observation.        PLAN:    1- RTC MD 6 months   2- Labs 3-4 days prior to next visit- CBC diff, CMP  SPEP, Serum IFXN. Light chain assay    RANDI MENDES MD    7/17/2017

## 2017-07-17 NOTE — PATIENT INSTRUCTIONS
1- RTC MD 6 months Scheduled  Marcia MILLER  2- Labs 3-4 days prior to next visit- CBC diff, CMP  SPEP, Serum IFXN. Light chain assay Scheduled  Marcia MILLER    AVS given to patient

## 2017-07-28 ENCOUNTER — HOSPITAL ENCOUNTER (OUTPATIENT)
Facility: CLINIC | Age: 72
Setting detail: OBSERVATION
Discharge: HOME | End: 2017-07-29
Attending: EMERGENCY MEDICINE | Admitting: INTERNAL MEDICINE
Payer: MEDICARE

## 2017-07-28 ENCOUNTER — APPOINTMENT (OUTPATIENT)
Dept: CT IMAGING | Facility: CLINIC | Age: 72
End: 2017-07-28
Attending: EMERGENCY MEDICINE
Payer: MEDICARE

## 2017-07-28 DIAGNOSIS — G20.A1 DEMENTIA DUE TO PARKINSON'S DISEASE WITHOUT BEHAVIORAL DISTURBANCE (H): Primary | ICD-10-CM

## 2017-07-28 DIAGNOSIS — M62.81 GENERALIZED MUSCLE WEAKNESS: ICD-10-CM

## 2017-07-28 DIAGNOSIS — F02.80 DEMENTIA DUE TO PARKINSON'S DISEASE WITHOUT BEHAVIORAL DISTURBANCE (H): Primary | ICD-10-CM

## 2017-07-28 DIAGNOSIS — S09.90XA CLOSED HEAD INJURY, INITIAL ENCOUNTER: ICD-10-CM

## 2017-07-28 DIAGNOSIS — R26.81 GAIT INSTABILITY: ICD-10-CM
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
## 2017-07-28 LAB
ANION GAP SERPL CALCULATED.3IONS-SCNC: 3 MMOL/L (ref 3–14)
BASOPHILS # BLD AUTO: 0 10E9/L (ref 0–0.2)
BASOPHILS NFR BLD AUTO: 0.2 %
BUN SERPL-MCNC: 21 MG/DL (ref 7–30)
CALCIUM SERPL-MCNC: 8.8 MG/DL (ref 8.5–10.1)
CHLORIDE SERPL-SCNC: 111 MMOL/L (ref 94–109)
CO2 BLDCOV-SCNC: 30 MMOL/L (ref 21–28)
CO2 SERPL-SCNC: 31 MMOL/L (ref 20–32)
CREAT SERPL-MCNC: 1.04 MG/DL (ref 0.52–1.04)
DIFFERENTIAL METHOD BLD: ABNORMAL
EOSINOPHIL # BLD AUTO: 0 10E9/L (ref 0–0.7)
EOSINOPHIL NFR BLD AUTO: 0.4 %
ERYTHROCYTE [DISTWIDTH] IN BLOOD BY AUTOMATED COUNT: 13.2 % (ref 10–15)
GFR SERPL CREATININE-BSD FRML MDRD: 52 ML/MIN/1.7M2
GLUCOSE SERPL-MCNC: 81 MG/DL (ref 70–99)
HCT VFR BLD AUTO: 32.9 % (ref 35–47)
HGB BLD-MCNC: 10.8 G/DL (ref 11.7–15.7)
IMM GRANULOCYTES # BLD: 0 10E9/L (ref 0–0.4)
IMM GRANULOCYTES NFR BLD: 0.6 %
LACTATE BLD-SCNC: 1 MMOL/L (ref 0.7–2.1)
LYMPHOCYTES # BLD AUTO: 0.8 10E9/L (ref 0.8–5.3)
LYMPHOCYTES NFR BLD AUTO: 16 %
MCH RBC QN AUTO: 35.6 PG (ref 26.5–33)
MCHC RBC AUTO-ENTMCNC: 32.8 G/DL (ref 31.5–36.5)
MCV RBC AUTO: 109 FL (ref 78–100)
MONOCYTES # BLD AUTO: 0.4 10E9/L (ref 0–1.3)
MONOCYTES NFR BLD AUTO: 8.4 %
NEUTROPHILS # BLD AUTO: 3.8 10E9/L (ref 1.6–8.3)
NEUTROPHILS NFR BLD AUTO: 74.4 %
NRBC # BLD AUTO: 0 10*3/UL
NRBC BLD AUTO-RTO: 0 /100
PCO2 BLDV: 56 MM HG (ref 40–50)
PH BLDV: 7.34 PH (ref 7.32–7.43)
PLATELET # BLD AUTO: 146 10E9/L (ref 150–450)
PO2 BLDV: 16 MM HG (ref 25–47)
POTASSIUM SERPL-SCNC: 4.4 MMOL/L (ref 3.4–5.3)
RBC # BLD AUTO: 3.03 10E12/L (ref 3.8–5.2)
SAO2 % BLDV FROM PO2: 19 %
SODIUM SERPL-SCNC: 145 MMOL/L (ref 133–144)
WBC # BLD AUTO: 5.1 10E9/L (ref 4–11)

## 2017-07-28 PROCEDURE — 72125 CT NECK SPINE W/O DYE: CPT

## 2017-07-28 PROCEDURE — 70450 CT HEAD/BRAIN W/O DYE: CPT

## 2017-07-28 PROCEDURE — G0378 HOSPITAL OBSERVATION PER HR: HCPCS

## 2017-07-28 PROCEDURE — 25000125 ZZHC RX 250: Performed by: PHYSICIAN ASSISTANT

## 2017-07-28 PROCEDURE — 85025 COMPLETE CBC W/AUTO DIFF WBC: CPT | Performed by: EMERGENCY MEDICINE

## 2017-07-28 PROCEDURE — 83605 ASSAY OF LACTIC ACID: CPT

## 2017-07-28 PROCEDURE — 27210995 ZZH RX 272: Performed by: PHYSICIAN ASSISTANT

## 2017-07-28 PROCEDURE — 80048 BASIC METABOLIC PNL TOTAL CA: CPT | Performed by: EMERGENCY MEDICINE

## 2017-07-28 PROCEDURE — 99220 ZZC INITIAL OBSERVATION CARE,LEVL III: CPT | Performed by: PHYSICIAN ASSISTANT

## 2017-07-28 PROCEDURE — 82803 BLOOD GASES ANY COMBINATION: CPT

## 2017-07-28 PROCEDURE — A9270 NON-COVERED ITEM OR SERVICE: HCPCS | Mod: GY | Performed by: PHYSICIAN ASSISTANT

## 2017-07-28 PROCEDURE — 25000132 ZZH RX MED GY IP 250 OP 250 PS 637: Mod: GY | Performed by: PHYSICIAN ASSISTANT

## 2017-07-28 PROCEDURE — 93005 ELECTROCARDIOGRAM TRACING: CPT

## 2017-07-28 PROCEDURE — 99285 EMERGENCY DEPT VISIT HI MDM: CPT | Mod: 25

## 2017-07-28 PROCEDURE — 96374 THER/PROPH/DIAG INJ IV PUSH: CPT

## 2017-07-28 RX ORDER — LISINOPRIL 10 MG/1
10 TABLET ORAL DAILY
Status: DISCONTINUED | OUTPATIENT
Start: 2017-07-29 | End: 2017-07-29 | Stop reason: HOSPADM

## 2017-07-28 RX ORDER — AMOXICILLIN 250 MG
1-2 CAPSULE ORAL 2 TIMES DAILY PRN
Status: DISCONTINUED | OUTPATIENT
Start: 2017-07-28 | End: 2017-07-29 | Stop reason: HOSPADM

## 2017-07-28 RX ORDER — PROCHLORPERAZINE MALEATE 5 MG
5 TABLET ORAL EVERY 6 HOURS PRN
Status: DISCONTINUED | OUTPATIENT
Start: 2017-07-28 | End: 2017-07-29 | Stop reason: HOSPADM

## 2017-07-28 RX ORDER — RIVASTIGMINE 9.5 MG/24H
1 PATCH, EXTENDED RELEASE TRANSDERMAL DAILY
COMMUNITY

## 2017-07-28 RX ORDER — GABAPENTIN 100 MG/1
100 CAPSULE ORAL 3 TIMES DAILY
Status: DISCONTINUED | OUTPATIENT
Start: 2017-07-29 | End: 2017-07-29 | Stop reason: HOSPADM

## 2017-07-28 RX ORDER — NALOXONE HYDROCHLORIDE 0.4 MG/ML
.1-.4 INJECTION, SOLUTION INTRAMUSCULAR; INTRAVENOUS; SUBCUTANEOUS
Status: DISCONTINUED | OUTPATIENT
Start: 2017-07-28 | End: 2017-07-29 | Stop reason: HOSPADM

## 2017-07-28 RX ORDER — SODIUM CHLORIDE 450 MG/100ML
INJECTION, SOLUTION INTRAVENOUS CONTINUOUS
Status: ACTIVE | OUTPATIENT
Start: 2017-07-28 | End: 2017-07-29

## 2017-07-28 RX ORDER — ONDANSETRON 2 MG/ML
4 INJECTION INTRAMUSCULAR; INTRAVENOUS EVERY 6 HOURS PRN
Status: DISCONTINUED | OUTPATIENT
Start: 2017-07-28 | End: 2017-07-29 | Stop reason: HOSPADM

## 2017-07-28 RX ORDER — MEMANTINE HYDROCHLORIDE 5 MG/1
10 TABLET ORAL 2 TIMES DAILY
Status: DISCONTINUED | OUTPATIENT
Start: 2017-07-28 | End: 2017-07-29 | Stop reason: HOSPADM

## 2017-07-28 RX ORDER — ACETAMINOPHEN 500 MG
1000 TABLET ORAL EVERY 6 HOURS PRN
Status: DISCONTINUED | OUTPATIENT
Start: 2017-07-28 | End: 2017-07-29 | Stop reason: HOSPADM

## 2017-07-28 RX ORDER — PROCHLORPERAZINE 25 MG
12.5 SUPPOSITORY, RECTAL RECTAL EVERY 12 HOURS PRN
Status: DISCONTINUED | OUTPATIENT
Start: 2017-07-28 | End: 2017-07-29 | Stop reason: HOSPADM

## 2017-07-28 RX ORDER — ONDANSETRON 4 MG/1
4 TABLET, ORALLY DISINTEGRATING ORAL EVERY 6 HOURS PRN
Status: DISCONTINUED | OUTPATIENT
Start: 2017-07-28 | End: 2017-07-29 | Stop reason: HOSPADM

## 2017-07-28 RX ORDER — RIVASTIGMINE 9.5 MG/24H
1 PATCH, EXTENDED RELEASE TRANSDERMAL DAILY
Status: DISCONTINUED | OUTPATIENT
Start: 2017-07-29 | End: 2017-07-29 | Stop reason: HOSPADM

## 2017-07-28 RX ORDER — PROPRANOLOL HYDROCHLORIDE 10 MG/1
10 TABLET ORAL 2 TIMES DAILY
Status: DISCONTINUED | OUTPATIENT
Start: 2017-07-28 | End: 2017-07-29 | Stop reason: HOSPADM

## 2017-07-28 RX ORDER — MECLIZINE HYDROCHLORIDE 25 MG/1
25 TABLET ORAL 3 TIMES DAILY PRN
Status: DISCONTINUED | OUTPATIENT
Start: 2017-07-28 | End: 2017-07-29 | Stop reason: HOSPADM

## 2017-07-28 RX ORDER — ENALAPRILAT 1.25 MG/ML
1.25 INJECTION INTRAVENOUS ONCE
Status: COMPLETED | OUTPATIENT
Start: 2017-07-28 | End: 2017-07-28

## 2017-07-28 RX ORDER — IBUPROFEN 600 MG/1
600 TABLET, FILM COATED ORAL EVERY 6 HOURS PRN
Status: DISCONTINUED | OUTPATIENT
Start: 2017-07-28 | End: 2017-07-29 | Stop reason: HOSPADM

## 2017-07-28 RX ORDER — CARBIDOPA AND LEVODOPA 25; 100 MG/1; MG/1
1 TABLET ORAL 3 TIMES DAILY
Status: DISCONTINUED | OUTPATIENT
Start: 2017-07-28 | End: 2017-07-29 | Stop reason: HOSPADM

## 2017-07-28 RX ADMIN — MEMANTINE 10 MG: 5 TABLET ORAL at 23:28

## 2017-07-28 RX ADMIN — SODIUM CHLORIDE: 4.5 INJECTION, SOLUTION INTRAVENOUS at 23:20

## 2017-07-28 RX ADMIN — CARBIDOPA AND LEVODOPA 1 TABLET: 25; 100 TABLET ORAL at 23:29

## 2017-07-28 RX ADMIN — PROPRANOLOL HYDROCHLORIDE 10 MG: 10 TABLET ORAL at 23:28

## 2017-07-28 RX ADMIN — ENALAPRILAT 1.25 MG: 1.25 INJECTION INTRAVENOUS at 20:26

## 2017-07-28 NOTE — H&P
[facility]    Observation Unit   Hospitalist History and Physical    Name: Roxanna Castorena    MRN: [id number]  YOB: 1945    Age: 71 year old  Date of Admission:  7/28/2017  Date of Service (when I saw the patient): 07/28/17    Assessment & Plan    Roxanna Castorena is a 71 year old female with PMH significant for h/o TIAs, Parkinson's disease, advanced dementia, HTN, CKD stage 3, monoclonal gammopathy, and anemia who presents with her  after a fall. ED workup reveals hypertensive and bradycardic otherwise VSS, sodium of 145, chloride of 111, VBG shows pH of 7.34, pCO2 of 56, pO2 of 16, bicarb of 30, Hgb of 10.8, EKG showing rate of 55 bpm in sinus bradycardia, CT of head shows right lateral parietal scalp hematoma, atrophy of brain, white matter changes consistent with sequelae of small vessel ischemic disease, and CT of cervical spine showed no evidence of acute trauma.       1. S/p fall and unsteady gait: unwitnessed fall into the shower at the patient's home where the patient did hit her head. Sustained right posterior scalp hematoma that is not actively bleeding. CT of head negative and CT of cervical spine shows no acute fractures. Patient denies any pain. No signs of any infectious etiology contributing. Suspect patient's fall earlier today was related to her Parkinson's and dementia that contribute to her mobility. Patient's  reports the patient falling about once per week. Patient does not use any assistive devices at home, would likely benefit from a walker. Patient's  was concerned that the patient's oxygen saturations dropped while in the ED. VBG is mildly abnormal, no concern for any respiratory distress at this time. No episodes of hypoxia this evening, will recheck ambulatory oxygen saturations in the AM.    - IVF hydration   - Recheck ambulatory oxygen saturations in AM   - Follow CBC and BMP  - PT consult   - SW consult for assistance with possible  home care at discharge     2. ?Post concussive syndrome: reports of being unsteady in the ED and per nursing while ambulating. Unclear if this is patient's baseline, per patient's  the patient is usually unsteady when she initially gets up and improves as she keeps moving. Patient may have dizziness from hitting her head during her fall earlier today, except unable to verbalize this. Will closely monitor for changes.   - PRN Meclizine available      3. Parkinson's disease: stable, continue Propanolol for tremor and Sinemet     4. HTN: hypertensive since arriving in the ED. Gave patient a dose of IV Enalapril while awaiting medication reconciliation.   - continue PTA Lisinopril     5. CKD stage 3: near baseline Cr of ~1.0 today. Continue to monitor and avoid nephrotoxic agents.     6. Monoclonal gammopathy: unknown significance, bone marrow biopsy in 10/2014 was unrevealing of myeloma, follows with Dr. Villagran of hematology and recently seen on 7/17/17 for follow up. Currently being observed with follow up every 6 months.    7. Advanced dementia: patient is minimally verbal at baseline, only able to answer no to certain questions and had garbled or incoherent speech when answering other questions. Confirmed with  this is her baseline.  - continue Memantine and Exelon patch     DVT Prophylaxis: Ambulate every shift  Code Status: DNR, discussed with patient's    Disposition: Expected stay <48 hours.     Primary Care Physician   Lui Jeffrey    Chief Complaint   Fall     History obtained from discussion with ED provider, chart review, and discussion with patient's  over the phone. Due to the patient's advanced dementia she is minimally verbal and unable to give a history.      History of Present Illness   Roxanna Castorena is a 71 year old female who presents for evaluation after a fall earlier today. Per patient's  he went to wake up the patient this morning and she didn't want to get up  at that time so he went to do a few things around the house. The patient's  reports that over the last few months the patient as been sleeping later and sometimes doesn't get out of bed until 12 PM. While doing other things around the house he heard a loud sound like the patient had fallen. The patient had gotten up and headed to the bathroom where she fell into the shower around 9 AM. It appeared that the patient hit her arm during the fall and her  placed a bandage over the area otherwise she did not appear to sustain any other injuries. The patient's  fed the patient her breakfast and she seemed to be at her baseline. At that time the patient's  went to take the dog out and was gone for approximately a hour or so. When he returned home the patient was back in bed and he went to get her up again around 12 PM and the patient was minimally responsive and wouldn't wake up. Due to hitting her head earlier the patient's  was concerned about the change in the patient's mental status and thus called EMS to bring the patient in for evaluation. Per patient's  while in the ED the patient's ambulatory oxygen saturations dropped to 80% and that was the reason the patient would be staying overnight for observation but per the ED provider the patient was unsteady when undergoing a trial of ambulating as if she was dizzy and possibly post concussive.     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 Gets nausea with 10mg tab     DEVIATED SEPTUM/NASAL CONGESTION      Dysphagia 10/31/2013     HTN (hypertension) 7/5/2012     LEFT BREAST TENDERNESS 7/02    Negative U/S     MIGRAINE HEADACHE  5/98     Monoclonal gammopathy      NASAL TIP SKIN LESION 5/03    Actinic keratosis     Osteopenia 10/31/2013     Other chronic pain      Parkinsons disease (H)      Synovitis and tenosynovitis, unspecified 4/03    Bilateral thumbs, s/p cortisone injections     Tremor 10/31/2013     Trigeminal neuralgia 8/00    Negative MRI Head     Unspecified cerebral artery occlusion with cerebral infarction     hx tia's yrs ago     Past Surgical History   Past Surgical History:   Procedure Laterality Date     BONE MARROW BIOPSY, BONE SPECIMEN, NEEDLE/TROCAR Right 10/9/2014    Procedure: BIOPSY BONE MARROW;  Surgeon: Lokesh Malik MD;  Location: [facility] OR     C NONSPECIFIC PROCEDURE  1979    SILVANA/BSO     C NONSPECIFIC PROCEDURE  1964    cholecystectomy     C NONSPECIFIC PROCEDURE  1951    appendectomy     HC UGI ENDOSCOPY, SIMPLE EXAM  09/26/13    [facility]     Prior to Admission Medications   Prior to Admission Medications   Prescriptions Last Dose Informant Patient Reported? Taking?   carbidopa-levodopa (SINEMET)  MG per tablet   Yes No   Sig: Take 1 tablet by mouth 3 times daily   gabapentin (NEURONTIN) 100 MG capsule   No No   Sig: Take 1 capsule (100 mg) by mouth 3 times daily As needed for  Facial nerve pain   lisinopril (PRINIVIL,ZESTRIL) 10 MG tablet   No No   Sig: Take 1 tablet (10 mg) by mouth daily   memantine (NAMENDA) 10 MG tablet   No No   Sig: Take 1 tablet (10 mg) by mouth 2 times daily   propranolol (INDERAL) 10 MG tablet   No No   Sig: Take 1 tablet (10 mg) by mouth 2 times daily   rivastigmine (EXELON) 4.6 MG/24HR   Yes No   Sig: Place 1 patch onto the skin daily Prescribed by Neurology      Facility-Administered Medications: None     Allergies   Allergies   Allergen Reactions     Donepezil      Hot flashes     Lyrica [Pregabalin] Hives     Social History   Social History   Substance Use Topics     Smoking status: Former Smoker     Quit date: 5/1/1973     Smokeless tobacco: Never Used       Comment: quitr 30 years ago     Alcohol use No      Comment: quit 7/2011     Social History     Social History Narrative     Family History   Family history reviewed with patient's  and is noncontributory.    Review of Systems   A Comprehensive greater than 10 system review of systems was carried out.  Pertinent positives and negatives are noted above. Otherwise negative for contributory information.    Physical Exam   Temp: 97.1  F (36.2  C) Temp src: Oral BP: 189/78 Pulse: 56 Heart Rate: 59 Resp: 16 SpO2: 99 % O2 Device: None (Room air)    Vital Signs with Ranges  Temp:  [97.1  F (36.2  C)-97.6  F (36.4  C)] 97.1  F (36.2  C)  Pulse:  [56] 56  Heart Rate:  [52-64] 59  Resp:  [12-16] 16  BP: (148-189)/() 189/78  SpO2:  [96 %-100 %] 99 %  113 lbs 1.6 oz    GEN:  Alert, attentive, nonverbal, appears comfortable, no overt distress  HEENT:  Right posterior scalp hematoma approximately 3-4 cm in diameter and slightly raised, no scleral icterus, no nasal discharge, mouth moist.  CV:  Regular rate and rhythm, no murmur or JVD.  S1 + S2 noted, no S3 or S4.  LUNGS:  Clear to auscultation bilaterally without rales/rhonchi/wheezing/retractions. Symmetric chest rise on inhalation noted.  ABD:  Active bowel sounds, soft, non-tender/non-distended. No rebound/guarding/rigidity.  EXT:  No edema. No cyanosis. No acute joint synovitis noted.  SKIN:  Dry to touch, no exanthems noted in the visualized areas.  NEURO:  Symmetric muscle strength, sensation to touch grossly intact. Coordination symmetric on general exam. No new focal deficits appreciated.    Data   Data reviewed today:  I personally reviewed the EKG tracing showing rate of 55 bpm in sinus bradycardia.     Results for orders placed or performed during the hospital encounter of 07/28/17   Cervical spine CT w/o contrast    Narrative    CT CERVICAL SPINE WITHOUT CONTRAST   7/28/2017 12:57 PM     HISTORY: Trauma from a fall.     TECHNIQUE: Axial images of the  cervical spine were obtained without  intravenous contrast. Multiplanar reformations were performed.   Radiation dose for this scan was reduced using automated exposure  control, adjustment of the mA and/or kV according to patient size, or  iterative reconstruction technique.    COMPARISON: 12/25/2007.    FINDINGS: There is no evidence of fracture.    Alignment: Reversal of lordosis, slightly worse.  Scoliosis convex to  the left.    Craniocervical junction: Normal.     C1-C2:  Moderate degeneration medial atlantoaxial joint.     C2-C3:  Mild degenerative disc disease. Mild bilateral degenerative  facet arthropathy.     C3-C4:  Normal disc. Moderate degeneration right facet joint.     C4-C5:  Moderate degenerative disc disease. Severe degeneration right  facet joint. Mild spinal canal stenosis.    C5-C6:  Moderate degenerative disc disease. Mild spinal canal  stenosis.      C6-C7:  Mild degenerative disc disease.      C7-T1:   Normal disc, facet joints, spinal canal and neural foramina.      Impression    IMPRESSION:    1. No evidence of acute trauma.  2. Degenerative changes, scoliosis and reversal of lordosis. These  findings have progressed since 2007.     STANTON BURGOS MD   CT Head w/o Contrast    Narrative    CT SCAN OF THE HEAD WITHOUT CONTRAST   7/28/2017 12:56 PM     HISTORY: Closed head injury.    TECHNIQUE:  Axial images of the head and coronal reformations without  IV contrast material. Radiation dose for this scan was reduced using  automated exposure control, adjustment of the mA and/or kV according  to patient size, or iterative reconstruction technique.    COMPARISON: 7/5/2011.    FINDINGS:  There is generalized atrophy of the brain.  There is low  attenuation in the white matter of the cerebral hemispheres consistent  with sequelae of small vessel ischemic disease. This is worse than on  the previous exam. There is no evidence of intracranial hemorrhage,  mass, acute infarct or anomaly.     The  visualized portions of the sinuses and mastoids appear normal.  There is a right lateral parietal scalp hematoma with no underlying  fracture and no evidence of intracranial hemorrhage.      Impression    IMPRESSION:   1.  Right lateral parietal scalp hematoma.  2.  Atrophy of the brain.  White matter changes consistent with  sequelae of small vessel ischemic disease. This is worse than on the  previous exam.     STANTON BURGOS MD   CBC + differential   Result Value Ref Range    WBC 5.1 4.0 - 11.0 10e9/L    RBC Count 3.03 (L) 3.8 - 5.2 10e12/L    Hemoglobin 10.8 (L) 11.7 - 15.7 g/dL    Hematocrit 32.9 (L) 35.0 - 47.0 %     (H) 78 - 100 fl    MCH 35.6 (H) 26.5 - 33.0 pg    MCHC 32.8 31.5 - 36.5 g/dL    RDW 13.2 10.0 - 15.0 %    Platelet Count 146 (L) 150 - 450 10e9/L    Diff Method Automated Method     % Neutrophils 74.4 %    % Lymphocytes 16.0 %    % Monocytes 8.4 %    % Eosinophils 0.4 %    % Basophils 0.2 %    % Immature Granulocytes 0.6 %    Nucleated RBCs 0 0 /100    Absolute Neutrophil 3.8 1.6 - 8.3 10e9/L    Absolute Lymphocytes 0.8 0.8 - 5.3 10e9/L    Absolute Monocytes 0.4 0.0 - 1.3 10e9/L    Absolute Eosinophils 0.0 0.0 - 0.7 10e9/L    Absolute Basophils 0.0 0.0 - 0.2 10e9/L    Abs Immature Granulocytes 0.0 0 - 0.4 10e9/L    Absolute Nucleated RBC 0.0    Basic metabolic panel (BMP)   Result Value Ref Range    Sodium 145 (H) 133 - 144 mmol/L    Potassium 4.4 3.4 - 5.3 mmol/L    Chloride 111 (H) 94 - 109 mmol/L    Carbon Dioxide 31 20 - 32 mmol/L    Anion Gap 3 3 - 14 mmol/L    Glucose 81 70 - 99 mg/dL    Urea Nitrogen 21 7 - 30 mg/dL    Creatinine 1.04 0.52 - 1.04 mg/dL    GFR Estimate 52 (L) >60 mL/min/1.7m2    GFR Estimate If Black 63 >60 mL/min/1.7m2    Calcium 8.8 8.5 - 10.1 mg/dL   EKG 12 lead   Result Value Ref Range    Interpretation ECG Click View Image link to view waveform and result    ISTAT gases lactate kd POCT   Result Value Ref Range    Ph Venous 7.34 7.32 - 7.43 pH    PCO2 Venous 56  (H) 40 - 50 mm Hg    PO2 Venous 16 (L) 25 - 47 mm Hg    Bicarbonate Venous 30 (H) 21 - 28 mmol/L    O2 Sat Venous 19 %    Lactic Acid 1.0 0.7 - 2.1 mmol/L     Claudette Emmanuel PA-C

## 2017-07-28 NOTE — PLAN OF CARE
Problem: Discharge Planning  Goal: Discharge Planning (Adult, OB, Behavioral, Peds)  Outcome: No Change  PRIMARY DIAGNOSIS: GENERALIZED WEAKNESS     OUTPATIENT/OBSERVATION GOALS TO BE MET BEFORE DISCHARGE  1. Orthostatic performed: N/A     2. Tolerating PO medications: Yes     3. Return to near baseline physical activity: No     4. Cleared for discharge by consultants (if involved): N/A     Discharge Planner Nurse   Safe discharge environment identified: No  Barriers to discharge: No       Entered by: Shona Montalvo 07/28/2017 5:15 PM     Please review provider order for any additional goals.   Nurse to notify provider when observation goals have been met and patient is ready for discharge.     Patient is A/o to self. Can answer yes/no question.  Up with a-1 with gait belt to restroom. Incontinent  of urine.  BP elevated, PA aware.  Bed alarm turned on.  Hematoma on the back of head, TY.  Bilateral elbow skin tears along with old skin tears on lower forearms. Patient has medication patch in middle of upper back.

## 2017-07-28 NOTE — ED PROVIDER NOTES
History     Chief Complaint:  Fall    HPI limited due to acuity of dementia subsequently provided by EMS  HPI   Roxanna Castorena is a 71 year old female who presents via EMS for evaluation after a fall. Per EMS, the patient is in advanced dementia and is nonverbal at baseline. She has been breathing on her own, but is unresponsive. She lives with her . She got up to got to the bathroom and fell, hitting her head.     Allergies:  Donepezil  Lyrica [Pregabalin]      Medications:    propranolol (INDERAL) 10 MG tablet  memantine (NAMENDA) 10 MG tablet  lisinopril (PRINIVIL,ZESTRIL) 10 MG tablet  gabapentin (NEURONTIN) 100 MG capsule  rivastigmine (EXELON) 4.6 MG/24HR  carbidopa-levodopa (SINEMET)  MG per table     Past Medical History:    Anemia   Cerebral aneurysm   CKD (chronic kidney disease) stage 3, GFR 30-59 ml/min   Colon polyps   Dementia   DEVIATED SEPTUM/NASAL CONGESTION   Dysphagia   HTN (hypertension)   LEFT BREAST TENDERNESS   MIGRAINE HEADACHE   Monoclonal gammopathy   NASAL TIP SKIN LESION   Osteopenia   Other chronic pain   Parkinsons disease (H)   Synovitis and tenosynovitis, unspecified   Tremor   Trigeminal neuralgia   Unspecified cerebral artery occlusion with cerebral infarction     Past Surgical History:    SILVANA/BSO  Cholecystectomy  Appendectomy     Family History:    Hypertension  Cerebrovascular disease  Cancer    Social History:  Marital Status:     Smoking status: Former   Alcohol use: No     Review of Systems   Unable to perform ROS: Dementia     Physical Exam     Patient Vitals for the past 24 hrs:   BP Temp Temp src Pulse Heart Rate Resp SpO2   07/28/17 1412 - - - - 64 - 99 %   07/28/17 1358 (!) 148/100 - - - 62 - 96 %   07/28/17 1356 160/81 - - - 53 - 100 %   07/28/17 1345 164/73 - - - 55 - 100 %   07/28/17 1315 148/76 - - - 56 - 100 %   07/28/17 1306 - 97.6  F (36.4  C) Oral - - - -   07/28/17 1304 153/72 - - - 52 - 100 %   07/28/17 1300 155/78 - - - 56 - -    07/28/17 1237 176/80 - - 56 56 12 100 %         Physical Exam  Constitutional: Alert, attentive, GCS 14 (but nonverbal at baseline  HENT: Right scalp hematoma, nonbleeding, ~4 cm diameter and slightly raised    Nose: Nose normal.   Mouth/Throat: Oropharynx is clear, mucous membranes are moist   Ears: Normal external ears. TMs clear bilaterally, normal external canals    bilaterally.  Eyes: EOM are normal. Pupils are equal, round, and reactive to light.   CV: Regular rate and rhythm, no murmurs, rubs or gallups.  Chest: Effort normal and breath sounds normal.   GI: No distension. There is no tenderness.  MSK: Normal range of motion.    C-spine immobilized   No tenderness or stepoffs to the C/T/L-spine   Normal, atraumatic inspection to the back   Pelvis stable   No extremity tenderness or deformity  Neurological: Alert, attentive. Nonverbal at baseline  Skin: Skin is warm and dry.   Skin tear to the right lateral elbow      Emergency Department Course   ECG:  @ 1234  Indication: fall  Vent. Rate 55 bpm. ND interval 112 ms. QRS duration 76 ms. QT/QTc 446/426 ms. P-R-T axis 54 39 79.   Otherwise normal ECG.  No significant change when compared to previous ECG from 10/9/14 Sinus bradycardia   Read @ 1239 by Dr. Barroso.    Imaging:  Radiology findings were communicated with the patient and family who voiced understanding of the findings.  C-Spine CT w/o contrast:  IMPRESSION:    1. No evidence of acute trauma.  2. Degenerative changes, scoliosis and reversal of lordosis. These  findings have progressed since 2007.   Report per radiology     CT head w/o contrast:  IMPRESSION:   1.  Right lateral parietal scalp hematoma.  2.  Atrophy of the brain.  White matter changes consistent with  sequelae of small vessel ischemic disease. This is worse than on the  previous exam.  Report per radiology      Laboratory:  Laboratory findings were communicated with the patient and family who voiced understanding of the  findings.  CBC: HGB 10.8 (L),  (L) o/w WNL. (WBC 5.1)   BMP:  (H), chloride 111 (H), GFR 52 (L), o/w WNL (Creatinine 1.04)  (1238) ISTAT gases lactate venous POCT: lactic acid 1.0, pH 7.34, pCO2 56 (H), pO2 16 (L), bicarbonate 30 (H), O2 saturation 19    Emergency Department Course:  Nursing notes and vitals reviewed.  I performed an exam of the patient as documented above.   IV was inserted and blood was drawn for laboratory testing, results above.  The patient was sent for a C-Spine CT w/o contrast and CT head w/o contrast while in the emergency department, results above.   1324: Patient rechecked. She is awake, alert, and at baseline per . Her C-spine is cleared.      1420: Recheck: Patient unable to ambulate, although nonverbal, appears dizzy and is weak, unable to stand without significant assist.  1519: I spoke with KALI Augustin for Dr. Berg/Dr. Noyola of the hospitalist service regarding patient's presentation, findings, and plan of care.  I discussed the treatment plan with the patient. They expressed understanding of this plan and consented to admission. I discussed the patient with  KALI Augustin for Dr. Berg/Dr. Noyola, who will admit the patient to a monitored bed for further evaluation and treatment.   I personally reviewed the laboratory and imaging results with the Patient and spouse and answered all related questions prior to admit.   Impression & Plan    Medical Decision Making:  Roxanna Castorena is a 71 year old female with a history of advanced dementia and parkinson's who presents after apparent fall with closed head injury. She had a decreased level of consciousness this morning, however, she has had a history of sleeping until noon at baseline. Her examination is back to baseline at this time but she is unable to walk. There is no obvious injury to explain this. It may be related to post concussive dizziness. She will be placed in the observation unit with plans for  supportive cares, possible placement at PT, and close observation.     Diagnosis:    ICD-10-CM    1. Closed head injury, initial encounter S09.90XA    2. Gait instability R26.81    3. Generalized muscle weakness M62.81        Disposition:  Admitted to observation under the supervision of KALI Augustin for Dr. Berg/Dr. Noyola     Scribe Disclosure:  I, Antwon Mancini, am serving as a scribe at 12:32 PM on 7/28/2017 to document services personally performed by Dusty Barroso MD, based on my observations and the provider's statements to me.  7/28/2017   [facility] EMERGENCY DEPARTMENT       Dusty Barroso MD  07/28/17 [id number]

## 2017-07-28 NOTE — ED NOTES
[facility]  ED Nurse Handoff Report    Roxanna Castorena is a 71 year old female   ED Chief complaint: No chief complaint on file.  . ED Diagnosis:   Final diagnoses:   Closed head injury, initial encounter   Gait instability   Generalized muscle weakness     Allergies:   Allergies   Allergen Reactions     Donepezil      Hot flashes     Lyrica [Pregabalin] Hives       Code Status: Full Code  Activity level - Baseline/Home:  Stand with Assist of 2. Activity Level - Current:   Stand with Assist of 2. Lift room needed: No. Bariatric: No   Needed: No   Isolation: No. Infection: Not Applicable.     Vital Signs:   Vitals:    07/28/17 1345 07/28/17 1356 07/28/17 1358 07/28/17 1412   BP: 164/73 160/81 (!) 148/100    Pulse:       Resp:       Temp:       TempSrc:       SpO2: 100% 100% 96% 99%       Cardiac Rhythm:  ,   Cardiac  Cardiac Rhythm: Sinus bradycardia  Pain level:    Patient confused: Yes... A&O to person,family& place. Disoriented to day & date. Has hard time following process.Patient Falls Risk: Yes.   Elimination Status: Unable to void   Patient Report - Initial Complaint: Fall. Focused Assessment: Small amount of swelling posterior head, right side.   Tests Performed: Labs,Imaging, ortho VS, Ambulatory test. Abnormal Results: During ambulation,pt's O@ sats dropped, she became very unsteady requiring 2 person assist back to bed( this is below baseline per ).   Treatments provided: see chart  Family Comments: She is more unsteady , not doing as well as she usually does.  OBS brochure/video discussed/provided to patient:  Yes  ED Medications: Medications - No data to display  Drips infusing:  No  For the majority of the shift this patient was Yellow. Interventions performed were IV.    Severe Sepsis OR Septic Shock Diagnosis Present: No      ED Nurse Name/Phone Number: Richard Echavarria,   2:40 PM  RECEIVING UNIT ED HANDOFF REVIEW    Above ED Nurse Handoff Report was reviewed:  Yes  Reviewed by: Shona Montalvo on July 28, 2017 at 3:39 PM

## 2017-07-28 NOTE — ED NOTES
Pt resting Comfortably in bed.  Pt responds to voice. Oriented to per. Dis-oriented to day, date, events.  Pt no specific complaints

## 2017-07-28 NOTE — ED NOTES
Patient ambulated in my presence. Heart rate and oxygen saturations were monitored during ambulation. Pt's heart rate ranged from 68-70. Her O2 sats decreased to 84-85%. She not responding to verbal questions. Pt became very unsteady on feet, requiring 2 person assistance back to bed.

## 2017-07-28 NOTE — PROGRESS NOTES
ROOM # 222    Living Situation (if not independent, order SW consult):With   Facility name:  :     Activity level at baseline: IND  Activity level on admit: A-1 with gait belt/A-2      Patient registered to observation; given Patient Bill of Rights; given the opportunity to ask questions about observation status and their plan of care.  Patient has been oriented to the observation room, bathroom and call light is in place.    Discussed discharge goals and expectations with patient/family.

## 2017-07-28 NOTE — IP AVS SNAPSHOT
MRN:[id number]                      After Visit Summary   7/28/2017    Roxanna Castorena    MRN: [id number]           Thank you!     Thank you for choosing [facility] for your care. Our goal is always to provide you with excellent care. Hearing back from our patients is one way we can continue to improve our services. Please take a few minutes to complete the written survey that you may receive in the mail after you visit. If you would like to speak to someone directly about your visit please contact Patient Relations at [phone]. Thank you!          Patient Information     Date Of Birth          1945        About your hospital stay     You were admitted on:  July 28, 2017 You last received care in the:  [facility] Observation Department    You were discharged on:  July 29, 2017        Reason for your hospital stay       You were hospitalized after a fall at home.  PT was consulted and felt you were at your baseline.  You were discharged with 24 hour supervision by family in your home with referrals to home RN, SW, PT and OT evaluations.  Please follow up with your PCP within one week and Neurology for ongoing Parkinson's management.                  Who to Call     For medical emergencies, please call 911.  For non-urgent questions about your medical care, please call your primary care provider or clinic, [phone]          Attending Provider     Provider Specialty    Dusty Barroso MD Emergency Medicine    Shakila Oconnell DO Internal Medicine       Primary Care Provider Office Phone # Fax #    Lui Jeffrey [phone]      After Care Instructions     Activity       Your activity upon discharge: activity as tolerated.  Patient needs 24 hours supervision.            Diet       Follow this diet upon discharge: Orders Placed This Encounter      Regular Diet Adult                  Follow-up Appointments     Follow-up and  recommended labs and tests        Follow up with PCP within one week.  You will need a hemoglobin recheck.                  Your next 10 appointments already scheduled     Jan 08, 2018  1:00 PM CST   Return Visit with RH ONCOLOGY NURSE   [facility]    [facility]  [address]  [facility] [address]   [phone]            Marquis 15, 2018  2:00 PM CST   Return Visit with Amber Villagran MD   [facility]    [facility]  [address]  [facility] [address]   [phone]              Additional Services     Home Care OT Referral for Hospital Discharge       OT to eval and treat    Your provider has ordered home care - occupational therapy. If you have not been contacted within 2 days of your discharge please call the department phone number listed on the top of this document.            Home Care PT Referral for Hospital Discharge       PT to eval and treat    Your provider has ordered home care - physical therapy. If you have not been contacted within 2 days of your discharge please call the department phone number listed on the top of this document.            Home Care Social Service Referral for Hospital Discharge        to assess home needs and community resources available.    Your provider has ordered home care - . If you have not been contacted within 2 days of your discharge please call the department phone number listed on the top of this document.            Home care nursing referral       RN extended hours visit. RN to assess vital signs and weight, respiratory and cardiac status, patients ability to take and record daily blood pressure, temp and weight, pain level and activity tolerance, hydration, nutrition and bowel status and home safety.    Your provider has ordered home care nursing services. If you  "have not been contacted within 2 days of your discharge please call the inpatient department phone number at [phone] .                             Pending Results     No orders found for last 3 day(s).            Statement of Approval     Ordered          17 1233  I have reviewed and agree with all the recommendations and orders detailed in this document.  EFFECTIVE NOW     Approved and electronically signed by:  Linda Plaza PA-C             Admission Information     Date & Time Provider Department Dept. Phone    2017 Shakila Oconnell DO [facility] Observation Department [phone]      Your Vitals Were     Blood Pressure Pulse Temperature Respirations Height Weight    137/61 55 97.7  F (36.5  C) (Oral) 16 1.626 m (5' 4\") 51.3 kg (113 lb 1.6 oz)    Pulse Oximetry BMI (Body Mass Index)                99% 19.41 kg/m2          MyChart Information     [facility] lets you send messages to your doctor, view your test results, renew your prescriptions, schedule appointments and more. To sign up, go to [email or web address] . Click on \"Log in\" on the left side of the screen, which will take you to the Welcome page. Then click on \"Sign up Now\" on the right side of the page.     You will be asked to enter the access code listed below, as well as some personal information. Please follow the directions to create your username and password.     Your access code is: [email or web address]  Expires: 2017  2:52 PM     Your access code will  in 90 days. If you need help or a new code, please call your [facility] clinic or [phone].        Care EveryWhere ID     This is your Care EveryWhere ID. This could be used by other organizations to access your [facility] medical records  [id number]        Equal Access to Services     MAGO RODRIGUEZ : Lane Jones, gustavo paul, claudia shah. So [facility] " [phone].    ATENCIÓN: Si prosper olsen, tiene a nicole disposición servicios gratuitos de asistencia lingüística. Fawad conde [phone].    We comply with applicable federal civil rights laws and [place] laws. We do not discriminate on the basis of race, color, national origin, age, disability sex, sexual orientation or gender identity.               Review of your medicines      CONTINUE these medicines which have NOT CHANGED        Dose / Directions    carbidopa-levodopa  MG per tablet   Commonly known as:  SINEMET   Notes to Patient:  Resume home regimen        Dose:  1 tablet   Take 1 tablet by mouth 3 times daily   Refills:  0       EXELON 9.5 MG/24HR 24 hr patch   Generic drug:  rivastigmine   Notes to Patient:  Resume home regimen        Dose:  1 patch   Place 1 patch onto the skin daily   Refills:  0       gabapentin 100 MG capsule   Commonly known as:  NEURONTIN   Used for:  Trigeminal neuralgia   Notes to Patient:  Resume home regimen        Dose:  100 mg   Take 1 capsule (100 mg) by mouth 3 times daily As needed for  Facial nerve pain   Quantity:  90 capsule   Refills:  11       lisinopril 10 MG tablet   Commonly known as:  PRINIVIL/ZESTRIL   Used for:  Benign essential hypertension   Notes to Patient:  Resume home regimen        Dose:  10 mg   Take 1 tablet (10 mg) by mouth daily   Quantity:  90 tablet   Refills:  3       memantine 10 MG tablet   Commonly known as:  NAMENDA   Used for:  Dementia without behavioral disturbance, unspecified dementia type   Notes to Patient:  Resume home regimen        Dose:  10 mg   Take 1 tablet (10 mg) by mouth 2 times daily   Quantity:  180 tablet   Refills:  3       propranolol 10 MG tablet   Commonly known as:  INDERAL   Used for:  Tremor   Notes to Patient:  Resume home regimen        Dose:  10 mg   Take 1 tablet (10 mg) by mouth 2 times daily   Quantity:  180 tablet   Refills:  3                Protect others around you: Learn how to safely use, store and  throw away your medicines at www.disposemymeds.org.             Medication List: This is a list of all your medications and when to take them. Check marks below indicate your daily home schedule. Keep this list as a reference.      Medications           Morning Afternoon Evening Bedtime As Needed    carbidopa-levodopa  MG per tablet   Commonly known as:  SINEMET   Take 1 tablet by mouth 3 times daily   Last time this was given:  1 tablet on 7/29/2017  9:15 AM   Notes to Patient:  Resume home regimen                                EXELON 9.5 MG/24HR 24 hr patch   Place 1 patch onto the skin daily   Last time this was given:  1 patch on 7/29/2017  9:15 AM   Generic drug:  rivastigmine   Notes to Patient:  Resume home regimen                                gabapentin 100 MG capsule   Commonly known as:  NEURONTIN   Take 1 capsule (100 mg) by mouth 3 times daily As needed for  Facial nerve pain   Last time this was given:  100 mg on 7/29/2017  9:15 AM   Notes to Patient:  Resume home regimen                                lisinopril 10 MG tablet   Commonly known as:  PRINIVIL/ZESTRIL   Take 1 tablet (10 mg) by mouth daily   Last time this was given:  10 mg on 7/29/2017  9:15 AM   Notes to Patient:  Resume home regimen                                memantine 10 MG tablet   Commonly known as:  NAMENDA   Take 1 tablet (10 mg) by mouth 2 times daily   Last time this was given:  10 mg on 7/29/2017  9:15 AM   Notes to Patient:  Resume home regimen                                propranolol 10 MG tablet   Commonly known as:  INDERAL   Take 1 tablet (10 mg) by mouth 2 times daily   Last time this was given:  10 mg on 7/29/2017  9:15 AM   Notes to Patient:  Resume home regimen

## 2017-07-28 NOTE — ED NOTES
Found down in shower this morning. Family believes fall happened around 9 AM. She was put back in bed as pt normally wakes at noon and pt was not at her baseline mentation when checked on at that time and not responding to stimuli. Pt arrived in cspine immobilization. Pt opening eyes and more responsive upon arrival per EMS. Bump noted to back of her head.

## 2017-07-28 NOTE — ED NOTES
Bed: ED33  Expected date: 7/28/17  Expected time: 12:10 PM  Means of arrival: Ambulance  Comments:  vianney [phone] F, fall, AMS

## 2017-07-29 ENCOUNTER — APPOINTMENT (OUTPATIENT)
Dept: PHYSICAL THERAPY | Facility: CLINIC | Age: 72
End: 2017-07-29
Attending: PHYSICIAN ASSISTANT
Payer: MEDICARE

## 2017-07-29 VITALS
BODY MASS INDEX: 19.31 KG/M2 | DIASTOLIC BLOOD PRESSURE: 61 MMHG | WEIGHT: 113.1 LBS | RESPIRATION RATE: 16 BRPM | HEART RATE: 55 BPM | OXYGEN SATURATION: 99 % | HEIGHT: 64 IN | SYSTOLIC BLOOD PRESSURE: 137 MMHG | TEMPERATURE: 97.7 F

## 2017-07-29 LAB
ANION GAP SERPL CALCULATED.3IONS-SCNC: 6 MMOL/L (ref 3–14)
BASOPHILS # BLD AUTO: 0 10E9/L (ref 0–0.2)
BASOPHILS NFR BLD AUTO: 0.5 %
BUN SERPL-MCNC: 18 MG/DL (ref 7–30)
CALCIUM SERPL-MCNC: 8 MG/DL (ref 8.5–10.1)
CHLORIDE SERPL-SCNC: 108 MMOL/L (ref 94–109)
CO2 SERPL-SCNC: 28 MMOL/L (ref 20–32)
CREAT SERPL-MCNC: 1.01 MG/DL (ref 0.52–1.04)
DIFFERENTIAL METHOD BLD: ABNORMAL
EOSINOPHIL # BLD AUTO: 0.1 10E9/L (ref 0–0.7)
EOSINOPHIL NFR BLD AUTO: 1.6 %
ERYTHROCYTE [DISTWIDTH] IN BLOOD BY AUTOMATED COUNT: 12.9 % (ref 10–15)
GFR SERPL CREATININE-BSD FRML MDRD: 54 ML/MIN/1.7M2
GLUCOSE SERPL-MCNC: 86 MG/DL (ref 70–99)
HCT VFR BLD AUTO: 29 % (ref 35–47)
HGB BLD-MCNC: 9.3 G/DL (ref 11.7–15.7)
HGB BLD-MCNC: 9.6 G/DL (ref 11.7–15.7)
IMM GRANULOCYTES # BLD: 0 10E9/L (ref 0–0.4)
IMM GRANULOCYTES NFR BLD: 0.3 %
INTERPRETATION ECG - MUSE: NORMAL
LYMPHOCYTES # BLD AUTO: 1.2 10E9/L (ref 0.8–5.3)
LYMPHOCYTES NFR BLD AUTO: 31.6 %
MCH RBC QN AUTO: 35 PG (ref 26.5–33)
MCHC RBC AUTO-ENTMCNC: 32.1 G/DL (ref 31.5–36.5)
MCV RBC AUTO: 109 FL (ref 78–100)
MONOCYTES # BLD AUTO: 0.4 10E9/L (ref 0–1.3)
MONOCYTES NFR BLD AUTO: 10.2 %
NEUTROPHILS # BLD AUTO: 2 10E9/L (ref 1.6–8.3)
NEUTROPHILS NFR BLD AUTO: 55.8 %
NRBC # BLD AUTO: 0 10*3/UL
NRBC BLD AUTO-RTO: 0 /100
PLATELET # BLD AUTO: 110 10E9/L (ref 150–450)
POTASSIUM SERPL-SCNC: 3.8 MMOL/L (ref 3.4–5.3)
RBC # BLD AUTO: 2.66 10E12/L (ref 3.8–5.2)
SODIUM SERPL-SCNC: 142 MMOL/L (ref 133–144)
WBC # BLD AUTO: 3.6 10E9/L (ref 4–11)

## 2017-07-29 PROCEDURE — A9270 NON-COVERED ITEM OR SERVICE: HCPCS | Mod: GY | Performed by: PHYSICIAN ASSISTANT

## 2017-07-29 PROCEDURE — G0378 HOSPITAL OBSERVATION PER HR: HCPCS

## 2017-07-29 PROCEDURE — 99217 ZZC OBSERVATION CARE DISCHARGE: CPT | Performed by: PHYSICIAN ASSISTANT

## 2017-07-29 PROCEDURE — 25000132 ZZH RX MED GY IP 250 OP 250 PS 637: Mod: GY | Performed by: PHYSICIAN ASSISTANT

## 2017-07-29 PROCEDURE — 97530 THERAPEUTIC ACTIVITIES: CPT | Mod: GP | Performed by: PHYSICAL THERAPIST

## 2017-07-29 PROCEDURE — 40000193 ZZH STATISTIC PT WARD VISIT: Performed by: PHYSICAL THERAPIST

## 2017-07-29 PROCEDURE — 80048 BASIC METABOLIC PNL TOTAL CA: CPT | Performed by: PHYSICIAN ASSISTANT

## 2017-07-29 PROCEDURE — 85018 HEMOGLOBIN: CPT | Performed by: PHYSICIAN ASSISTANT

## 2017-07-29 PROCEDURE — 97116 GAIT TRAINING THERAPY: CPT | Mod: GP | Performed by: PHYSICAL THERAPIST

## 2017-07-29 PROCEDURE — 36415 COLL VENOUS BLD VENIPUNCTURE: CPT | Performed by: PHYSICIAN ASSISTANT

## 2017-07-29 PROCEDURE — 97162 PT EVAL MOD COMPLEX 30 MIN: CPT | Mod: GP | Performed by: PHYSICAL THERAPIST

## 2017-07-29 PROCEDURE — 85025 COMPLETE CBC W/AUTO DIFF WBC: CPT | Performed by: PHYSICIAN ASSISTANT

## 2017-07-29 RX ADMIN — GABAPENTIN 100 MG: 100 CAPSULE ORAL at 09:15

## 2017-07-29 RX ADMIN — CARBIDOPA AND LEVODOPA 1 TABLET: 25; 100 TABLET ORAL at 09:15

## 2017-07-29 RX ADMIN — RIVASTIGMINE 1 PATCH: 9.5 PATCH, EXTENDED RELEASE TRANSDERMAL at 09:15

## 2017-07-29 RX ADMIN — MEMANTINE 10 MG: 5 TABLET ORAL at 09:15

## 2017-07-29 RX ADMIN — PROPRANOLOL HYDROCHLORIDE 10 MG: 10 TABLET ORAL at 09:15

## 2017-07-29 RX ADMIN — LISINOPRIL 10 MG: 10 TABLET ORAL at 09:15

## 2017-07-29 NOTE — PHARMACY-ADMISSION MEDICATION HISTORY
Admission medication history interview status for this patient is complete. See [facility] admission navigator for allergy information, prior to admission medications and immunization status.     Medication history interview source(s):Family  Medication history resources (including written lists, pill bottles, clinic record):None  Primary pharmacy:[facility]    Changes made to PTA medication list:  Added: none  Deleted: none  Changed: Exelon from 4.5 to 9.5mg/24hr    Actions taken by pharmacist (provider contacted, etc):None     Additional medication history information:None    Medication reconciliation/reorder completed by provider prior to medication history? No    For patients on insulin therapy: No (Yes/No)  Time spent in this activity: 10 min    Prior to Admission medications    Medication Sig Last Dose Taking? Auth Provider   rivastigmine (EXELON) 9.5 MG/24HR 24 hr patch Place 1 patch onto the skin daily 7/28/2017 at Unknown time Yes Unknown, Entered By History   propranolol (INDERAL) 10 MG tablet Take 1 tablet (10 mg) by mouth 2 times daily 7/28/2017 at am Yes Lui Jeffrey MD   memantine (NAMENDA) 10 MG tablet Take 1 tablet (10 mg) by mouth 2 times daily 7/28/2017 at am Yes Lui Jeffrey MD   gabapentin (NEURONTIN) 100 MG capsule Take 1 capsule (100 mg) by mouth 3 times daily As needed for  Facial nerve pain  at prn Yes Lui Jeffrey MD   carbidopa-levodopa (SINEMET)  MG per tablet Take 1 tablet by mouth 3 times daily 7/28/2017 at x1 Yes Reported, Patient   lisinopril (PRINIVIL,ZESTRIL) 10 MG tablet Take 1 tablet (10 mg) by mouth daily   Lui Jeffrey MD

## 2017-07-29 NOTE — PLAN OF CARE
Problem: Discharge Planning  Goal: Discharge Planning (Adult, OB, Behavioral, Peds)  Outcome: No Change  PRIMARY DIAGNOSIS: GENERALIZED WEAKNESS w/ Fall at home      OUTPATIENT/OBSERVATION GOALS TO BE MET BEFORE DISCHARGE  1. Orthostatic performed: N/A      2. Tolerating PO medications: Yes      3. Return to near baseline physical activity: No - pt w/ unsteady gait       4. Cleared for discharge by consultants (if involved): N/A      Discharge Planner Nurse   Safe discharge environment identified: Yes -  states he wants to take pt home.  PT/OT pending.    Barriers to discharge: Not once medically cleared          Please review provider order for any additional goals.   Nurse to notify provider when observation goals have been met and patient is ready for discharge.      Patient is alert and oriented to self, VSS, Patient was very restless in her room but has now been sleeping the last 3 hours. VPM patient for safety  Up with A-2 due to unsteady gait. Will continue to monitor, assess, and offer supportive cares

## 2017-07-29 NOTE — PLAN OF CARE
Problem: Discharge Planning  Goal: Discharge Planning (Adult, OB, Behavioral, Peds)  Outcome: No Change  Problem: Discharge Planning  Goal: Discharge Planning (Adult, OB, Behavioral, Peds)  Outcome: No Change  Problem: Discharge Planning  Goal: Discharge Planning (Adult, OB, Behavioral, Peds)  Outcome: No Change  PRIMARY DIAGNOSIS: GENERALIZED WEAKNESS, FALL AT HOME      OUTPATIENT/OBSERVATION GOALS TO BE MET BEFORE DISCHARGE  1. Orthostatic performed: N/A      2. Tolerating PO medications: Yes      3. Return to near baseline physical activity: No - pt needs a lot of redirection, assist of 1      4. Cleared for discharge by consultants (if involved): Yes, per medical team, ordered home RN, PT, OT,  was not ready to send patient to Memory/Long term care but given resources for them.      Discharge Planner Nurse   Safe discharge environment identified: Yes, ordered home care services  Barriers to discharge: No      Please review provider order for any additional goals.   Nurse to notify provider when observation goals have been met and patient is ready for discharge.      Patient disoriented to place, time, situation. Patient has been up ambulating with PT to the bathroom with assist of one, patient needs a lot of direction, and cues. Patient unable to verbalize pain or discomfort but looks calm and comfortable. Per PT and Social work, advised patient to have LTC/Memory care.  is not ready to place her in that setting, but has accepted home services for patient. Will await d/c orders.

## 2017-07-29 NOTE — PROGRESS NOTES
Physical Therapy Discharge Summary    Reason for therapy discharge:    No further expectations of functional progress.    Progress towards therapy goal(s). See goals on Care Plan in [facility] electronic health record for goal details.  Goals met    Therapy recommendation(s):    Home PT safety eval and treat is recommended to evaluate safety in home.  Nursing/MSW at home also recommended to assist with resources available to assist pt and  in home with home safety.

## 2017-07-29 NOTE — PLAN OF CARE
Problem: Discharge Planning  Goal: Discharge Planning (Adult, OB, Behavioral, Peds)  Outcome: No Change  Problem: Discharge Planning  Goal: Discharge Planning (Adult, OB, Behavioral, Peds)  Outcome: No Change  PRIMARY DIAGNOSIS: GENERALIZED WEAKNESS, FALL AT HOME      OUTPATIENT/OBSERVATION GOALS TO BE MET BEFORE DISCHARGE  1. Orthostatic performed: N/A      2. Tolerating PO medications: Yes      3. Return to near baseline physical activity: No - pt needs a lot of redirection, assist of 1      4. Cleared for discharge by consultants (if involved): No,  to see patient      Discharge Planner Nurse   Safe discharge environment identified: No, social work is working with patient's  to find placement for patient.    Barriers to discharge: Not once medically cleared      Please review provider order for any additional goals.   Nurse to notify provider when observation goals have been met and patient is ready for discharge.     Patient disoriented to place, time, situation. Patient has been up ambulating with PT to the bathroom with assist of one, patient needs a lot of direction, and cues. Patient unable to verbalize pain or discomfort but looks calm and comfortable. Awaiting social work consult.

## 2017-07-29 NOTE — PLAN OF CARE
Problem: Discharge Planning  Goal: Discharge Planning (Adult, OB, Behavioral, Peds)  Outcome: No Change  PRIMARY DIAGNOSIS: GENERALIZED WEAKNESS w/ Fall at home      OUTPATIENT/OBSERVATION GOALS TO BE MET BEFORE DISCHARGE  1. Orthostatic performed: N/A      2. Tolerating PO medications: Yes      3. Return to near baseline physical activity: No - pt w/ unsteady gait       4. Cleared for discharge by consultants (if involved): N/A      Discharge Planner Nurse   Safe discharge environment identified: Yes -  states he wants to take pt home.  PT/OT pending.    Barriers to discharge: Not once medically cleared          Please review provider order for any additional goals.   Nurse to notify provider when observation goals have been met and patient is ready for discharge.      VPM in room, pt picks at IV but has not attempted to get oob on her own.  She is oriented to person only.  IV vasotec given for htn, home meds not reordered yet.

## 2017-07-29 NOTE — PLAN OF CARE
Problem: Discharge Planning  Goal: Discharge Planning (Adult, OB, Behavioral, Peds)  Outcome: No Change  PRIMARY DIAGNOSIS: GENERALIZED WEAKNESS w/ Fall at home      OUTPATIENT/OBSERVATION GOALS TO BE MET BEFORE DISCHARGE  1. Orthostatic performed: N/A      2. Tolerating PO medications: Yes      3. Return to near baseline physical activity: No - pt w/ unsteady gait       4. Cleared for discharge by consultants (if involved): N/A      Discharge Planner Nurse   Safe discharge environment identified: Yes -  states he wants to take pt home.  PT/OT pending.    Barriers to discharge: Not once medically cleared          Please review provider order for any additional goals.   Nurse to notify provider when observation goals have been met and patient is ready for discharge.      Patient is very restless in her room, VPM patient for safety but patient continuously tries to oob on her own. Considering bedside attendant if behavior continues.  She is oriented to person only.  VSS up with A-2 due to unsteady gait. Will continue to monitor, assess, and offer supportive cares

## 2017-07-29 NOTE — DISCHARGE SUMMARY
[facility] Observation Unit Discharge Summary          Roxanna Castorena MRN# [id number]   Age: 71 year old YOB: 1945     Date of Admission:  7/28/2017  Date of Discharge::  7/29/2017  Admitting Physician:  Shakila Oconnell, DO  Discharge Physician:  Linda Plaza PA-C  Primary Physician: Lui Jeffrey     Primary Discharge Diagnoses:   S/p Fall  Right Posterior Scalp Hematoma     Secondary Discharge Diagnoses:   Parkinson's Disease  Advanced Dementia  HTN  CKD Stage 3  Monoclonal Gammopathy     Hospital Course:   For detail history, please refer to H & P from 7/28/2017. In brief, this is a 71 year old female with a PMH significant for h/o TIAs, Parkinson's disease, advanced dementia, HTN, CKD stage 3, monoclonal gammopathy, and anemia who presented to the ED on 7/28/17 with her  after a fall at home.     ED workup revealed hypertensive and bradycardic 56 otherwise VSS, sodium of 145, chloride of 111, VBG shows pH of 7.34, pCO2 of 56, pO2 of 16, bicarb of 30, Hgb of 10.8, EKG showing rate of 55 bpm in sinus bradycardia, CT of head shows right lateral parietal scalp hematoma, atrophy of brain, white matter changes consistent with sequelae of small vessel ischemic disease, and CT of cervical spine showed no evidence of acute trauma.       Patient was admitted to the observation unit.  She remained hemodynamically stable, HR 55-83  and afebrile on room air.  She received IVF hydration.  Repeat laboratories the next morning showed a decrease in wbc, hgb and plt likely 2/2 IVF hydration.  There was no sign of any further bleeding.  A repeat hgb was checked and was stable at 9.6.   stated patient is followed by Hematology for Monoclonal Gammopathy and he will follow up with PCP within one week for further monitoring.   stated patient falls approximately once per week.  Currently fall most likely 2/2 underlying Parkinson's disease affecting her mobility.   Physical Therapy was consulted who recommended home with 24 hour supervision and assist which the  stated he is able to provide and wished the patient to discharge home.  Pt did not recommend a walker at this time as they felt it would be a safety risk for her.  The  uses community resources for adult  when he needs to be gone from the home. Home PT, RN, OT and Social Work were consulted for home evaluation.   felt patient is at her baseline at the time of discharge.  He was open to receiving further services at home as he would like to hold off in placing the patient in a memory care unit as long as possible.  Patient to follow up with PCP within one week.             Procedures/Imaging:     Results for orders placed or performed during the hospital encounter of 07/28/17   Cervical spine CT w/o contrast    Narrative    CT CERVICAL SPINE WITHOUT CONTRAST   7/28/2017 12:57 PM     HISTORY: Trauma from a fall.     TECHNIQUE: Axial images of the cervical spine were obtained without  intravenous contrast. Multiplanar reformations were performed.   Radiation dose for this scan was reduced using automated exposure  control, adjustment of the mA and/or kV according to patient size, or  iterative reconstruction technique.    COMPARISON: 12/25/2007.    FINDINGS: There is no evidence of fracture.    Alignment: Reversal of lordosis, slightly worse.  Scoliosis convex to  the left.    Craniocervical junction: Normal.     C1-C2:  Moderate degeneration medial atlantoaxial joint.     C2-C3:  Mild degenerative disc disease. Mild bilateral degenerative  facet arthropathy.     C3-C4:  Normal disc. Moderate degeneration right facet joint.     C4-C5:  Moderate degenerative disc disease. Severe degeneration right  facet joint. Mild spinal canal stenosis.    C5-C6:  Moderate degenerative disc disease. Mild spinal canal  stenosis.      C6-C7:  Mild degenerative disc disease.      C7-T1:   Normal disc, facet  "joints, spinal canal and neural foramina.      Impression    IMPRESSION:    1. No evidence of acute trauma.  2. Degenerative changes, scoliosis and reversal of lordosis. These  findings have progressed since 2007.     STANTON BURGOS MD   CT Head w/o Contrast    Narrative    CT SCAN OF THE HEAD WITHOUT CONTRAST   7/28/2017 12:56 PM     HISTORY: Closed head injury.    TECHNIQUE:  Axial images of the head and coronal reformations without  IV contrast material. Radiation dose for this scan was reduced using  automated exposure control, adjustment of the mA and/or kV according  to patient size, or iterative reconstruction technique.    COMPARISON: 7/5/2011.    FINDINGS:  There is generalized atrophy of the brain.  There is low  attenuation in the white matter of the cerebral hemispheres consistent  with sequelae of small vessel ischemic disease. This is worse than on  the previous exam. There is no evidence of intracranial hemorrhage,  mass, acute infarct or anomaly.     The visualized portions of the sinuses and mastoids appear normal.  There is a right lateral parietal scalp hematoma with no underlying  fracture and no evidence of intracranial hemorrhage.      Impression    IMPRESSION:   1.  Right lateral parietal scalp hematoma.  2.  Atrophy of the brain.  White matter changes consistent with  sequelae of small vessel ischemic disease. This is worse than on the  previous exam.     STANTON BURGOS MD       Consultations:   Physical Therapy    Code Status:   DNR    Allergies:     Allergies   Allergen Reactions     Donepezil      Hot flashes     Lyrica [Pregabalin] Hives        Subjective:   Patient reports \"I feel fine\".  Answers no to ROS questions.     Physical Exam:   Blood pressure 135/61, pulse 68, temperature 97.2  F (36.2  C), temperature source Oral, resp. rate 16, height 1.626 m (5' 4\"), weight 51.3 kg (113 lb 1.6 oz), SpO2 98 %, not currently breastfeeding. on room air  General: Alert, interactive, NAD.  HEENT: " Right posterior scalp hematoma approximately 3-4 cm in diameter and slightly raised, no scleral icterus.  Resp: clear to auscultation bilaterally, no crackles or wheezes  Cardiac: regular rate and rhythm, no murmur  Abdomen: Soft, nontender, nondistended. +BS.   Extremities: No LE edema  Skin: Warm and dry, no jaundice or rash  Neuro: Alert and verbal.  Answer some questions.  No oriented to time or place., no focal deficits, moves all extremities equally      Discharge Medicatios:        Discharge Medication List as of 7/29/2017 12:35 PM      CONTINUE these medications which have NOT CHANGED    Details   rivastigmine (EXELON) 9.5 MG/24HR 24 hr patch Place 1 patch onto the skin daily, Historical      propranolol (INDERAL) 10 MG tablet Take 1 tablet (10 mg) by mouth 2 times daily, Disp-180 tablet, R-3, E-Prescribe      memantine (NAMENDA) 10 MG tablet Take 1 tablet (10 mg) by mouth 2 times daily, Disp-180 tablet, R-3, E-PrescribeProfile only. Pt doesn't require refill at this time      gabapentin (NEURONTIN) 100 MG capsule Take 1 capsule (100 mg) by mouth 3 times daily As needed for  Facial nerve pain, Disp-90 capsule, R-11, E-Prescribe      carbidopa-levodopa (SINEMET)  MG per tablet Take 1 tablet by mouth 3 times daily, Historical      lisinopril (PRINIVIL,ZESTRIL) 10 MG tablet Take 1 tablet (10 mg) by mouth daily, Disp-90 tablet, R-3, E-PrescribeProfile only. Pt doesn't require refill at this time             Instructions Given to Patient as Discharge:     Discharge Procedure Orders  Home care nursing referral   Referral Type: Home Health Therapies & Aides     Home Care PT Referral for Hospital Discharge   Referral Type: Home Health Therapies & Aides     Home Care OT Referral for Hospital Discharge     Home Care Social Service Referral for Hospital Discharge     Activity   Order Comments: Your activity upon discharge: activity as tolerated.  Patient needs 24 hours supervision.   Order Specific Question  Answer Comments   Is discharge order? Yes      Reason for your hospital stay   Order Comments: You were hospitalized after a fall at home.  PT was consulted and felt you were at your baseline.  You were discharged with 24 hour supervision by family in your home with referrals to home RN, SW, PT and OT evaluations.  Please follow up with your PCP within one week and Neurology for ongoing Parkinson's management.     Follow-up and recommended labs and tests    Order Comments: Follow up with PCP within one week.  You will need a hemoglobin recheck.     DNR (Do Not Resuscitate)     Diet   Order Comments: Follow this diet upon discharge: Orders Placed This Encounter     Regular Diet Adult   Order Specific Question Answer Comments   Is discharge order? Yes        Pending Tests at Discharge:   none    Discharge Disposition:   Discharged to home     Linda MARY-ELIZABETH  Hospitalist Service  Pager [phone]    >30 minutes was spent in discharge planning, care coordination, physical examination and medication reconciliation on the date of discharge, 7/29/2017

## 2017-07-29 NOTE — CONSULTS
..Care Transition Initial Assessment - PEPE  Reason For Consult: discharge planning  Met with pt's  Chon regarding discharge planning. Pt lives at home with  and dog. PEPE informed Chon that MD is recommending 24/7 supervision or a long term memory care. Chon stated that he is with pt all the time. When he leave he usually take her to Adult Day care. PEPE recommended home care services. PEPE also discussed with pt [facility] CADI/EW waiver for pt. Chon stated that he will look into it.     PEPE printed resources for [facility] CADI/EW waiver and gave it to Chon. PEPE also printed resources for Long term memory care.  PEPE sent referral for Home care services for PT, OT, RN, Home health Aid & SW through [facility].      Active Problems:    Fall         DATA  Lives With: spouse  Living Arrangements: house  Description of Support System: Supportive, Involved  Who is your support system?:   Support Assessment: Lacks necessary supervision and assistance, Lacks adequate physical care.   Identified issues/concerns regarding health management: None         Quality Of Family Relationships: supportive, involved  Transportation Available: family or friend will provide      ASSESSMENT  Cognitive Status: Unreponsive  Concerns to be addressed: Dementia        PLAN  Financial costs for the patient includes: None  Patient given options and choices for discharge: Yes  Patient/family is agreeable to the plan?  YES  Patient Goals and Preferences: Dc home with PT, OT, RN, Home health aid & SW  Patient anticipates discharging to: Home with Home care services

## 2017-07-29 NOTE — PLAN OF CARE
Problem: Discharge Planning  Goal: Discharge Planning (Adult, OB, Behavioral, Peds)  Outcome: Adequate for Discharge Date Met:  07/29/17  Patient's After Visit Summary was reviewed with patient spouse  Patient verbalized understanding of After Visit Summary, recommended follow up and was given an opportunity to ask questions.   Discharge medications sent home with patient/family: Not applicable   Discharged with spouse       OBSERVATION patient END time: 1300

## 2017-07-29 NOTE — PLAN OF CARE
Problem: Goal Outcome Summary  Goal: Goal Outcome Summary  Discharge Planner PT   Patient plan for discharge:  No further PT needed while in the hospital.  Recommend home with HHPT home safety eval and treatment, social work intervention for ability to stay in her home safely.   Vs memory care unit assist.  Current status: Pt with difficulty processing multi and simple step directions , such as washing hands - cued to wash hands and not able to process, assist needed to doff/marija undergarments,  Unsteady on her feet with decreased foot clearance B, short strides,  One episode of LOB when turning while in the bathroom.    Trialed a walker she was able to take longer strides and had better foot clearance, however, she also needed directions, cues on proper use, safety and occasionally bumped into landin.  Walker would be a safety risk for her.   Performed 3 stairs with CGA and one rail.     Barriers to return to prior living situation: safety concerns due to cognitive impairments, pt falling atleast 1 x a week per MD note per .  I do not know if pt has stairs.   Recommendations for discharge: 24 hour supervision and assist.  Recommend home with HHPT with home safety eval and treatment, MSW involvement for intervention for home safety/services available to allow her to stay in home with  as  wishes this per RN  Vs need for memory care unit  Rationale for recommendations:  Pt's current status is likely baseline status and pt will continue to be a fall risk, minimal learning potential due to cognitive impairments.       Entered by: Nancy Roberts 07/29/2017 9:20 AM

## 2017-07-29 NOTE — PROGRESS NOTES
"   07/29/17 1000   Quick Adds   Type of Visit Initial PT Evaluation   Living Environment   Lives With spouse   Living Arrangements house   Home Accessibility (not able to get reliable information from pt)   Transportation Available family or friend will provide   Living Environment Comment not able to get reliable informatino from pt   Self-Care   Usual Activity Tolerance fair   Current Activity Tolerance fair   Equipment Currently Used at Home bath bench;raised toilet;walker, rolling   Activity/Exercise/Self-Care Comment pt having falls 1 x a week per MD note/ report.   Functional Level Prior   Ambulation 0-->independent   Transferring 0-->independent   Fall history within last six months yes   Number of times patient has fallen within last six months (falls 1 x a week)   Which of the above functional risks had a recent onset or change? ambulation;fall history   Prior Functional Level Comment pt falling 1 x a week,  ambulation without a device.  Uncertain how much  assisted her with ADL's, safety in home as not available at this time.    General Information   Onset of Illness/Injury or Date of Surgery - Date 07/28/17   Referring Physician Claudette Emmanuel PA-C   Patient/Family Goals Statement unintelligble and cognitive impairments   Pertinent History of Current Problem (include personal factors and/or comorbidities that impact the POC) Pt admitted after a fall into the shower in am and later in day decreased arousability from nap.  CT showed R lateral parietal scalp hematoma, negative c spine.   Some question of possible post concussive symptoms as  felt pt seemed a little less steady and more \"dizzy\" than baseline.  Pt with history of TIA's, parkinsons, advanced dementia,  see chart for rest of history.    Precautions/Limitations fall precautions   General Observations pt trying to get out of bed upon PT arrival.    Cognitive Status Examination   Orientation (person?)   Level of " Consciousness alert   Follows Commands and Answers Questions unable to follow multi-step instructions;unable to answer questions;speech unintelligible   Personal Safety and Judgment at risk behaviors demonstrated   Cognitive Comment not able to motor process to perform tasks such as don/doff undergarments or wash hands with verbal cues or with visual cues.    Posture    Posture Kyphosis   Range of Motion (ROM)   ROM Comment gastroc, hamstring tightness   Strength   Strength Comments functional not able to assess with MMT   Bed Mobility   Bed Mobility Comments IND   Transfer Skills   Transfer Comments sit to and from supine and sit to and from stand close SBA to min assist.    Gait   Gait Comments ambulation without device with CGA, shuffleling gait with decreased foot clearance B  R >L . slight flexed knees and hips   Balance   Balance Comments decreased dynamic balance with one episode of LOB falling into the wall to the right while turning in the bathroom,  Lacks balance reactions.    Coordination   Coordination Comments decreased motor coordinaion and motor planning   Muscle Tone   Muscle Tone Comments no tremor noted   General Therapy Interventions   Intervention Comments gait training with FWW and stairs. Limited learning potential for carryover   Clinical Impression   Criteria for Skilled Therapeutic Intervention no   PT Diagnosis decline in cognitive processing and function affecting safety with gait and mobiltiy   Influenced by the following impairments cognitive decline, muscle shortening, decreased balance reaction response   Functional limitations due to impairments fall risk, falling 1 x a week atleast per notes in chart. , needing supervision/assist 24 /7   Clinical Presentation Evolving/Changing   Clinical Presentation Rationale function fluctuating with changes to cognitive status, fatigue.    Clinical Decision Making (Complexity) Moderate complexity   Therapy Frequency` (1x)   Predicted Duration of  "Therapy Intervention (days/wks) 1x   Anticipated Discharge Disposition Home with Assist;Home with Home Therapy;Long Term Care Facility  (24/7 supervision and assist)   Risk & Benefits of therapy have been explained Yes   Patient, Family & other staff in agreement with plan of care Other (comment)  (limited by cognitive status.)   Clinical Impression Comments pt ambulated better with FWW however this would pose a safety risk to use on own- I feel she would forget to use it and it may be a hazard in her path at this time unless ambulating with someone which should have assistance even without FWW.    [facility] AM-PAC  \"6 Clicks\" V.2 Basic Mobility Inpatient Short Form   1. Turning from your back to your side while in a flat bed without using bedrails? 4 - None   2. Moving from lying on your back to sitting on the side of a flat bed without using bedrails? 3 - A Little   3. Moving to and from a bed to a chair (including a wheelchair)? 3 - A Little   4. Standing up from a chair using your arms (e.g., wheelchair, or bedside chair)? 3 - A Little   5. To walk in hospital room? 3 - A Little   6. Climbing 3-5 steps with a railing? 3 - A Little   Basic Mobility Raw Score (Score out of 24.Lower scores equate to lower levels of function) 19   Total Evaluation Time   Total Evaluation Time (Minutes) 17     "

## 2017-08-03 ENCOUNTER — OFFICE VISIT (OUTPATIENT)
Dept: INTERNAL MEDICINE | Facility: CLINIC | Age: 72
End: 2017-08-03
Payer: MEDICARE

## 2017-08-03 VITALS
HEART RATE: 66 BPM | WEIGHT: 118 LBS | DIASTOLIC BLOOD PRESSURE: 66 MMHG | OXYGEN SATURATION: 98 % | TEMPERATURE: 97.8 F | SYSTOLIC BLOOD PRESSURE: 134 MMHG | BODY MASS INDEX: 20.25 KG/M2

## 2017-08-03 DIAGNOSIS — R60.9 EDEMA, UNSPECIFIED TYPE: Primary | ICD-10-CM
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
## 2017-08-03 DIAGNOSIS — F03.90 DEMENTIA WITHOUT BEHAVIORAL DISTURBANCE, UNSPECIFIED DEMENTIA TYPE: ICD-10-CM

## 2017-08-03 DIAGNOSIS — D61.818 PANCYTOPENIA (H): ICD-10-CM

## 2017-08-03 DIAGNOSIS — G20.A1 PARKINSON DISEASE (H): ICD-10-CM

## 2017-08-03 LAB
ERYTHROCYTE [DISTWIDTH] IN BLOOD BY AUTOMATED COUNT: 12.6 % (ref 10–15)
HCT VFR BLD AUTO: 33.5 % (ref 35–47)
HGB BLD-MCNC: 10.6 G/DL (ref 11.7–15.7)
IRON SATN MFR SERPL: 23 % (ref 15–46)
IRON SERPL-MCNC: 75 UG/DL (ref 35–180)
MCH RBC QN AUTO: 35.1 PG (ref 26.5–33)
MCHC RBC AUTO-ENTMCNC: 31.6 G/DL (ref 31.5–36.5)
MCV RBC AUTO: 111 FL (ref 78–100)
PLATELET # BLD AUTO: 157 10E9/L (ref 150–450)
RBC # BLD AUTO: 3.02 10E12/L (ref 3.8–5.2)
TIBC SERPL-MCNC: 332 UG/DL (ref 240–430)
WBC # BLD AUTO: 5.3 10E9/L (ref 4–11)

## 2017-08-03 PROCEDURE — 36415 COLL VENOUS BLD VENIPUNCTURE: CPT | Performed by: INTERNAL MEDICINE

## 2017-08-03 PROCEDURE — 83550 IRON BINDING TEST: CPT | Performed by: INTERNAL MEDICINE

## 2017-08-03 PROCEDURE — 99214 OFFICE O/P EST MOD 30 MIN: CPT | Performed by: INTERNAL MEDICINE

## 2017-08-03 PROCEDURE — 83540 ASSAY OF IRON: CPT | Performed by: INTERNAL MEDICINE

## 2017-08-03 PROCEDURE — 85027 COMPLETE CBC AUTOMATED: CPT | Performed by: INTERNAL MEDICINE

## 2017-08-03 NOTE — MR AVS SNAPSHOT
After Visit Summary   8/3/2017    Roxanna Castorena    MRN: [id number]           Patient Information     Date Of Birth          1945        Visit Information        Provider Department      8/3/2017 2:30 PM Lui Jeffrey MD [facility]        Care Instructions      At your visit today, we discussed your risk for falls and preventive options.    Fall Prevention  Falls often occur due to slipping, tripping or losing your balance. Millions of people fall every year and injure themselves. Here are ways to reduce your risk of falling again.    Think about your fall, was there anything that caused your fall that can be fixed, removed, or replaced?    Make your home safe by keeping walkways clear of objects you may trip over.    Use non-slip pads under rugs. Do not use area rugs or small throw rugs.    Use non-slip mats in bathtubs and showers.    Install handrails and lights on staircases.    Do not walk in poorly lit areas.    Do not stand on chairs or wobbly ladders.    Use caution when reaching overhead or looking upward. This position can cause a loss of balance.    Be sure your shoes fit properly, have non-slip bottoms and are in good condition.     Wear shoes both inside and out. Avoid going barefoot or wearing slippers.    Be cautious when going up and down stairs, curbs, and when walking on uneven sidewalks.    If your balance is poor, consider using a cane or walker.    If your fall was related to alcohol use, stop or limit alcohol intake.     If your fall was related to use of sleeping medicines, talk to your doctor about this. You may need to reduce your dosage at bedtime if you awaken during the night to go to the bathroom.      To reduce the need for nighttime bathroom trips:    Avoid drinking fluids for several hours before going to bed    Empty your bladder before going to bed    Men can keep a urinal at the bedside    Stay as active as you can. Balance,  flexibility, strength, and endurance all come from exercise. They all play a role in preventing falls. Ask your healthcare provider which types of activity are right for you.    Get your vision checked on a regular basis.    If you have pets, know where they are before you stand up or walk so you don't trip over them.    Use night lights.  Date Last Reviewed: 11/5/2015 2000-2017 [employer]. [address] [place] [address], [place] [address]. All rights reserved. This information is not intended as a substitute for professional medical care. Always follow your healthcare professional's instructions.       Labs for worsened anemia  Continue current meds  Moisturizer such as Vanicream daily as needed for dry skin   May use OTC knee high compression stockings (On AM and off later PM) if leg swelling bothersome  24 hrs supervision   Would contact Home care to have the physical therapy safety assessment done          Follow-ups after your visit        Your next 10 appointments already scheduled     Jan 08, 2018  1:00 PM CST   Return Visit with  ONCOLOGY NURSE   [facility] ([facility])    [facility]  [address]  [facility] [address]   [phone]            Marquis 15, 2018  2:00 PM CST   Return Visit with Amber Villagran MD   [facility] ([facility])    [facility]  [address]  [facility] [address]   [phone]              Who to contact     If you have questions or need follow up information about today's clinic visit or your schedule please contact [facility] directly at [phone].  Normal or non-critical lab and imaging results will be communicated to you by MyChart, letter or phone within 4 business days after the clinic has received the results. If you do not hear from us within 7 days, please contact the clinic through [employer]  "or phone. If you have a critical or abnormal lab result, we will notify you by phone as soon as possible.  Submit refill requests through [employer] or call your pharmacy and they will forward the refill request to us. Please allow 3 business days for your refill to be completed.          Additional Information About Your Visit        [employer] Information     [employer] lets you send messages to your doctor, view your test results, renew your prescriptions, schedule appointments and more. To sign up, go to [email or web address] . Click on \"Log in\" on the left side of the screen, which will take you to the Welcome page. Then click on \"Sign up Now\" on the right side of the page.     You will be asked to enter the access code listed below, as well as some personal information. Please follow the directions to create your username and password.     Your access code is: [email or web address]  Expires: 2017  2:52 PM     Your access code will  in 90 days. If you need help or a new code, please call your [facility] clinic or [phone].        Care EveryWhere ID     This is your Care EveryWhere ID. This could be used by other organizations to access your [facility] medical records  [id number]        Your Vitals Were     Pulse Temperature Pulse Oximetry BMI (Body Mass Index)          66 97.8  F (36.6  C) (Oral) 98% 20.25 kg/m2         Blood Pressure from Last 3 Encounters:   17 134/66   17 137/61   17 153/71    Weight from Last 3 Encounters:   17 118 lb (53.5 kg)   17 113 lb 1.6 oz (51.3 kg)   17 118 lb (53.5 kg)              Today, you had the following     No orders found for display       Primary Care Provider Office Phone # Fax #    Lui Jeffrey [phone]       [facility] [address] [facility] [address]        Equal Access to Services     MAGO RODRIGUEZ AH: Lane Jones, gustavo paul, claudia shah adeeg " allison woodall ah. So [facility] [phone].    ATENCIÓN: Si prosper olsen, tiene a nicole disposición servicios gratuitos de asistencia lingüística. Fawad conde [phone].    We comply with applicable federal civil rights laws and [place] laws. We do not discriminate on the basis of race, color, national origin, age, disability sex, sexual orientation or gender identity.            Thank you!     Thank you for choosing [facility]  for your care. Our goal is always to provide you with excellent care. Hearing back from our patients is one way we can continue to improve our services. Please take a few minutes to complete the written survey that you may receive in the mail after your visit with us. Thank you!             Your Updated Medication List - Protect others around you: Learn how to safely use, store and throw away your medicines at www.disposemymeds.org.          This list is accurate as of: 8/3/17  3:11 PM.  Always use your most recent med list.                   Brand Name Dispense Instructions for use Diagnosis    carbidopa-levodopa  MG per tablet    SINEMET     Take 1 tablet by mouth 3 times daily        EXELON 9.5 MG/24HR 24 hr patch   Generic drug:  rivastigmine      Place 1 patch onto the skin daily        gabapentin 100 MG capsule    NEURONTIN    90 capsule    Take 1 capsule (100 mg) by mouth 3 times daily As needed for  Facial nerve pain    Trigeminal neuralgia       lisinopril 10 MG tablet    PRINIVIL/ZESTRIL    90 tablet    Take 1 tablet (10 mg) by mouth daily    Benign essential hypertension       memantine 10 MG tablet    NAMENDA    180 tablet    Take 1 tablet (10 mg) by mouth 2 times daily    Dementia without behavioral disturbance, unspecified dementia type       propranolol 10 MG tablet    INDERAL    180 tablet    Take 1 tablet (10 mg) by mouth 2 times daily    Tremor

## 2017-08-03 NOTE — PROGRESS NOTES
SUBJECTIVE:                                                    Roxanna Castorena is a 71 year old female who presents to clinic today for the following health issues:          Hospital Follow-up Visit:    Hospital/Nursing Home/IP Rehab Facility: [facility]  Date of Admission: 07/28/2017  Date of Discharge: 07/29/2017  Reason(s) for Admission: Fall, Right Posterior Scalp Hematoma            Problems taking medications regularly:  None       Medication changes since discharge: None       Problems adhering to non-medication therapy:  None    Summary of hospitalization:  [facility] discharge summary reviewed  Diagnostic Tests/Treatments reviewed.  Follow up needed: labs  Other Healthcare Providers Involved in Patient s Care:         Homecare and Physical Therapy  Update since discharge: stable.     Post Discharge Medication Reconciliation: discharge medications reconciled, continue medications without change.  Plan of care communicated with patient and      Coding guidelines for this visit:  Type of Medical   Decision Making Face-to-Face Visit       within 7 Days of discharge Face-to-Face Visit        within 14 days of discharge   Moderate Complexity [id number]   High Complexity 91402 [id number]              Hospital Course:   For detail history, please refer to H & P from 7/28/2017. In brief, this is a 71 year old female with a PMH significant for h/o TIAs, Parkinson's disease, advanced dementia, HTN, CKD stage 3, monoclonal gammopathy, and anemia who presented to the ED on 7/28/17 with her  after a fall at home.      ED workup revealed hypertensive and bradycardic 56 otherwise VSS, sodium of 145, chloride of 111, VBG shows pH of 7.34, pCO2 of 56, pO2 of 16, bicarb of 30, Hgb of 10.8, EKG showing rate of 55 bpm in sinus bradycardia, CT of head shows right lateral parietal scalp hematoma, atrophy of brain, white matter changes consistent with sequelae of small vessel ischemic disease,  and CT of cervical spine showed no evidence of acute trauma.        Patient was admitted to the observation unit.  She remained hemodynamically stable, HR 55-83  and afebrile on room air.  She received IVF hydration.  Repeat laboratories the next morning showed a decrease in wbc, hgb and plt likely 2/2 IVF hydration.  There was no sign of any further bleeding.  A repeat hgb was checked and was stable at 9.6.   stated patient is followed by Hematology for Monoclonal Gammopathy and he will follow up with PCP within one week for further monitoring.   stated patient falls approximately once per week.  Currently fall most likely 2/2 underlying Parkinson's disease affecting her mobility.  Physical Therapy was consulted who recommended home with 24 hour supervision and assist which the  stated he is able to provide and wished the patient to discharge home.  Pt did not recommend a walker at this time as they felt it would be a safety risk for her.  The  uses [employer] resources for adult  when he needs to be gone from the home. Home PT, RN, OT and Social Work were consulted for home evaluation.   felt patient is at her baseline at the time of discharge.  He was open to receiving further services at home as he would like to hold off in placing the patient in a memory care unit as long as possible.  Patient to follow up with PCP within one week.     Hx  obtained from hospital notes and . Pt has severe dementia and not able to provide recent history details.  still prefers to have pt at home. Has someone come in to home when he goes elsewhere.  Has home safety eval ordered at time of discharge and  was called to set up but has not returned phone call yet. Denies chest pain, shortness of breath, abdominal pain, headache, vision changes. NO known orthopnea sx seen by . Mild edema BLE        Pt's past medical history, family history, habits, medications and  "allergies were reviewed with the patient today.  See snap shot for  HCM status. Most recent lab results reviewed with pt. Problem list and histories reviewed & adjusted, as indicated.  Additional history as below:       Additional ROS:   Constitutional, HEENT, Cardiovascular, Pulmonary, GI and , Neuro, MSK and Psych review of systems/symptoms are otherwise negative or unchanged from previous, except as noted above.      OBJECTIVE:  /66  Pulse 66  Temp 97.8  F (36.6  C) (Oral)  Wt 118 lb (53.5 kg)  SpO2 98%  BMI 20.25 kg/m2   Estimated body mass index is 20.25 kg/(m^2) as calculated from the following:    Height as of 7/28/17: 5' 4\" (1.626 m).    Weight as of this encounter: 118 lb (53.5 kg).  Eye: PERRL, EOMI  HENT: ear canals and TM's normal and nose and mouth without ulcers or lesions   Neck: no adenopathy. Thyroid normal to palpation. No bruits  Pulm: Lungs clear to auscultation   CV: Regular rates and rhythm  GI: Soft, nontender, Normal active bowel sounds, No hepatosplenomegaly or masses palpable  Ext: Peripheral pulses intact. Mild BLE nonpitting edema. Skin of legs mildly dry  Neuro: Normal strength and tone, sensory exam grossly normal. 0/3 recall 5 min. Slight shuffling gait. Mild cogwheel rigidity. Pt speaks only when direct questions posed to her  Skin: No abrasion noted on scalp currently      Assessment/Plan: (See plan discussion below for further details)  1. Fall, subsequent encounter  Due to # 3&4. No syncopal issues. PT does not recommend walker. Encouraged  to return phone call to set up home safety eval by home care/PT/OT    2. Pancytopenia (H)  Labs as ordered. No visible bruising. Followed by Hematology for known multiple myeloma  - CBC with platelets  - Iron and iron binding capacity    3. Parkinson disease (H)  Continue meds per neurology. Affecting #1    4. Dementia without behavioral disturbance, unspecified dementia type  Continue meds per neurology. Re-emphasized alden "  that pt requires 24 hr supervision and never safe to leave alone at home. If not able to meet those requirements, then pt will require  Transfer to memory unit NH longterm    5. Edema, unspecified type  Pt to follow low salt intake. No obvious orthopnea seen by . Discussed options of OTC knee high compression stockings also with pt/. Prefer to avoid diuretics as likely to increase risk of nocturia and recurrent fall if used      Plan discussion:   Labs for pancytopenia follow-up  Continue current meds  Moisturizer such as Vanicream daily as needed for dry skin   May use OTC knee high compression stockings (On AM and off later PM) if leg swelling bothersome  24 hrs supervision   Would contact Home care to have the physical therapy safety assessment done       Lui Jeffrey MD  Internal Medicine Department  [facility]

## 2017-08-03 NOTE — NURSING NOTE
"Chief Complaint   Patient presents with     Hospital F/U       Initial /66  Pulse 66  Temp 97.8  F (36.6  C) (Oral)  Wt 118 lb (53.5 kg)  SpO2 98%  BMI 20.25 kg/m2 Estimated body mass index is 20.25 kg/(m^2) as calculated from the following:    Height as of 7/28/17: 5' 4\" (1.626 m).    Weight as of this encounter: 118 lb (53.5 kg).  Medication Reconciliation: complete  "

## 2017-08-03 NOTE — PATIENT INSTRUCTIONS
At your visit today, we discussed your risk for falls and preventive options.    Fall Prevention  Falls often occur due to slipping, tripping or losing your balance. Millions of people fall every year and injure themselves. Here are ways to reduce your risk of falling again.    Think about your fall, was there anything that caused your fall that can be fixed, removed, or replaced?    Make your home safe by keeping walkways clear of objects you may trip over.    Use non-slip pads under rugs. Do not use area rugs or small throw rugs.    Use non-slip mats in bathtubs and showers.    Install handrails and lights on staircases.    Do not walk in poorly lit areas.    Do not stand on chairs or wobbly ladders.    Use caution when reaching overhead or looking upward. This position can cause a loss of balance.    Be sure your shoes fit properly, have non-slip bottoms and are in good condition.     Wear shoes both inside and out. Avoid going barefoot or wearing slippers.    Be cautious when going up and down stairs, curbs, and when walking on uneven sidewalks.    If your balance is poor, consider using a cane or walker.    If your fall was related to alcohol use, stop or limit alcohol intake.     If your fall was related to use of sleeping medicines, talk to your doctor about this. You may need to reduce your dosage at bedtime if you awaken during the night to go to the bathroom.      To reduce the need for nighttime bathroom trips:    Avoid drinking fluids for several hours before going to bed    Empty your bladder before going to bed    Men can keep a urinal at the bedside    Stay as active as you can. Balance, flexibility, strength, and endurance all come from exercise. They all play a role in preventing falls. Ask your healthcare provider which types of activity are right for you.    Get your vision checked on a regular basis.    If you have pets, know where they are before you stand up or walk so you don't trip over  them.    Use night lights.  Date Last Reviewed: 11/5/2015 2000-2017 [employer] [address] [place] [address], [place] [address]. All rights reserved. This information is not intended as a substitute for professional medical care. Always follow your healthcare professional's instructions.       Labs for worsened anemia  Continue current meds  Moisturizer such as Vanicream daily as needed for dry skin   May use OTC knee high compression stockings (On AM and off later PM) if leg swelling bothersome  24 hrs supervision   Would contact Home care to have the physical therapy safety assessment done

## 2017-08-09 ENCOUNTER — DOCUMENTATION ONLY (OUTPATIENT)
Dept: CARE COORDINATION | Facility: CLINIC | Age: 72
End: 2017-08-09

## 2017-09-09 ENCOUNTER — HEALTH MAINTENANCE LETTER (OUTPATIENT)
Age: 72
End: 2017-09-09

## 2017-10-07 ENCOUNTER — NURSE TRIAGE (OUTPATIENT)
Dept: NURSING | Facility: CLINIC | Age: 72
End: 2017-10-07

## 2017-10-07 NOTE — TELEPHONE ENCOUNTER
reports patient was seen in [place] ER for UTI and was prescribed an antibiotic.  Since last night patient has been having diarrhea.  Denies fever, vomiting, blood in stool, or signs of dehydration.  Triaged symptoms and advised home care and gave care advice per guideline.  Advised  to call back if symptoms persist, or worsen. Caller verbalized understanding and is appreciative of information.    Additional Information    Negative: Shock suspected (e.g., cold/pale/clammy skin, too weak to stand, low BP, rapid pulse)    Negative: Difficult to awaken or acting confused  (e.g., disoriented, slurred speech)    Negative: Sounds like a life-threatening emergency to the triager    Negative: Vomiting also present and worse than the diarrhea    Negative: [1] Blood in stool AND [2] without diarrhea    Negative: [1] SEVERE abdominal pain (e.g., excruciating) AND [2] present > 1 hour    Negative: [1] SEVERE abdominal pain AND [2] age > 60    Negative: [1] Blood in the stool AND [2] moderate or large amount of blood    Negative: Black or tarry bowel movements  (Exception: chronic-unchanged  black-grey bowel movements AND is taking iron pills or Pepto-bismol)    Negative: [1] Drinking very little AND [2] dehydration suspected (e.g., no urine > 12 hours, very dry mouth, very lightheaded)    Negative: Patient sounds very sick or weak to the triager    Negative: [1] SEVERE diarrhea (e.g., 7 or more times / day more than normal) AND [2]  age > 60 years    Negative: [1] Constant abdominal pain AND [2] present > 2 hours    Negative: [1] Fever > 103 F (39.4 C) AND [2] not able to get the fever down using Fever Care Advice    Negative: [1] SEVERE diarrhea (e.g., 7 or more times / day more than normal) AND [2] present > 24 hours (1 day)    Negative: [1] MODERATE diarrhea (e.g., 4-6 times / day more than normal) AND [2] present > 48 hours (2 days)    Negative: [1] MODERATE diarrhea (e.g., 4-6 times / day more than normal)  AND [2] age > 70 years    Negative: Fever > 101 F (38.3 C)    Negative: Fever present > 3 days (72 hours)    Negative: Abdominal pain  (Exception: Pain clears with each passage of diarrhea stool)    Negative: [1] Blood in the stool AND [2] small amount of blood   (Exception: only on toilet paper. Reason: diarrhea can cause rectal irritation with blood on wiping)    Negative: [1] Mucus or pus in stool AND [2] present > 2 days AND [3] diarrhea is more than mild    Negative: [1] Recent antibiotic therapy (i.e., within last 2 months) AND [2] > 3 days since antibiotic was stopped    Negative: Weak immune system (e.g., HIV positive, cancer chemo, splenectomy, organ transplant, chronic steroids)    Negative: Tube feedings (e.g., nasogastric, g-tube, j-tube)    Negative: Travel to a foreign country in past month    Negative: [1] MILD (e.g., 1-3 or more stools than normal in past 24 hours) diarrhea without known cause AND [2] present >  7 days    Negative: Diarrhea is a chronic symptom (recurrent or ongoing AND present > 4 weeks)    MILD-MODERATE diarrhea (e.g., 1-6 times / day more than normal) (all triage questions negative)    Protocols used: DIARRHEA-ADULT-

## 2017-10-10 ENCOUNTER — TELEPHONE (OUTPATIENT)
Dept: INTERNAL MEDICINE | Facility: CLINIC | Age: 72
End: 2017-10-10

## 2017-10-10 ENCOUNTER — OFFICE VISIT (OUTPATIENT)
Dept: INTERNAL MEDICINE | Facility: CLINIC | Age: 72
End: 2017-10-10
Payer: MEDICARE

## 2017-10-10 VITALS
WEIGHT: 114 LBS | HEART RATE: 58 BPM | DIASTOLIC BLOOD PRESSURE: 60 MMHG | SYSTOLIC BLOOD PRESSURE: 130 MMHG | TEMPERATURE: 98 F | BODY MASS INDEX: 19.57 KG/M2 | OXYGEN SATURATION: 96 %

## 2017-10-10 DIAGNOSIS — N30.90 BLADDER INFECTION: Primary | ICD-10-CM

## 2017-10-10 DIAGNOSIS — R19.7 DIARRHEA, UNSPECIFIED TYPE: ICD-10-CM

## 2017-10-10 LAB
ALBUMIN UR-MCNC: NEGATIVE MG/DL
APPEARANCE UR: CLEAR
BILIRUB UR QL STRIP: NEGATIVE
COLOR UR AUTO: YELLOW
GLUCOSE UR STRIP-MCNC: 100 MG/DL
HGB UR QL STRIP: ABNORMAL
KETONES UR STRIP-MCNC: NEGATIVE MG/DL
LEUKOCYTE ESTERASE UR QL STRIP: NEGATIVE
NITRATE UR QL: NEGATIVE
NON-SQ EPI CELLS #/AREA URNS LPF: NORMAL /LPF
PH UR STRIP: 5.5 PH (ref 5–7)
RBC #/AREA URNS AUTO: NORMAL /HPF
SOURCE: ABNORMAL
SP GR UR STRIP: 1.01 (ref 1–1.03)
UROBILINOGEN UR STRIP-ACNC: 0.2 EU/DL (ref 0.2–1)
WBC #/AREA URNS AUTO: NORMAL /HPF

## 2017-10-10 PROCEDURE — 81001 URINALYSIS AUTO W/SCOPE: CPT | Performed by: INTERNAL MEDICINE

## 2017-10-10 PROCEDURE — 99213 OFFICE O/P EST LOW 20 MIN: CPT | Performed by: INTERNAL MEDICINE

## 2017-10-10 RX ORDER — NICOTINE 14MG/24HR
PATCH, TRANSDERMAL 24 HOURS TRANSDERMAL
Status: ON HOLD | COMMUNITY
End: 2017-10-27

## 2017-10-10 NOTE — MR AVS SNAPSHOT
"              After Visit Summary   10/10/2017    Roxanna Castorena    MRN: [id number]           Patient Information     Date Of Birth          1945        Visit Information        Provider Department      10/10/2017 10:45 AM Lui Jeffrey MD [facility]        Today's Diagnoses     Bladder infection    -  1    Diarrhea, unspecified type           Follow-ups after your visit        Your next 10 appointments already scheduled     Jan 08, 2018  1:00 PM CST   Return Visit with  ONCOLOGY NURSE   [facility]    [facility]  [address]  [facility] [id number] [phone]            Marquis 15, 2018  2:00 PM CST   Return Visit with Amber Villagran MD   [facility]    [facility]  [address]  [facility] [id number] [phone]              Who to contact     If you have questions or need follow up information about today's clinic visit or your schedule please contact [facility] directly at [phone].  Normal or non-critical lab and imaging results will be communicated to you by MyChart, letter or phone within 4 business days after the clinic has received the results. If you do not hear from us within 7 days, please contact the clinic through garbshart or phone. If you have a critical or abnormal lab result, we will notify you by phone as soon as possible.  Submit refill requests through [employer] or call your pharmacy and they will forward the refill request to us. Please allow 3 business days for your refill to be completed.          Additional Information About Your Visit        MyChart Information     [employer] lets you send messages to your doctor, view your test results, renew your prescriptions, schedule appointments and more. To sign up, go to [email or web address] . Click on \"Log in\" on the " "left side of the screen, which will take you to the Welcome page. Then click on \"Sign up Now\" on the right side of the page.     You will be asked to enter the access code listed below, as well as some personal information. Please follow the directions to create your username and password.     Your access code is: [email or web address]  Expires: 2018 11:33 PM     Your access code will  in 90 days. If you need help or a new code, please call your [facility] clinic or [phone].        Care EveryWhere ID     This is your Care EveryWhere ID. This could be used by other organizations to access your [facility] medical records  [id number]        Your Vitals Were     Pulse Temperature Pulse Oximetry BMI (Body Mass Index)          58 98  F (36.7  C) (Oral) 96% 19.57 kg/m2         Blood Pressure from Last 3 Encounters:   10/10/17 130/60   17 134/66   17 137/61    Weight from Last 3 Encounters:   10/10/17 114 lb (51.7 kg)   17 118 lb (53.5 kg)   17 113 lb 1.6 oz (51.3 kg)              We Performed the Following     UA reflex to Microscopic and Culture     Urine Microscopic        Primary Care Provider Office Phone # Fax #    Lui Jeffrey [phone]       [address] [facility] [address]        Equal Access to Services     VY RODRIGUEZ : Hadii aad ku hadasho Soomaali, waaxda luqadaha, qaybta kaalmada adeegyada, claudia hector. So [facility] [phone].    ATENCIÓN: Si habla español, tiene a nicole disposición servicios gratuitos de asistencia lingüística. Llame al [phone].    We comply with applicable federal civil rights laws and [place] laws. We do not discriminate on the basis of race, color, national origin, age, disability, sex, sexual orientation, or gender identity.            Thank you!     Thank you for choosing [facility]  for your care. Our goal is always to provide you with excellent care. Hearing back from our patients " is one way we can continue to improve our services. Please take a few minutes to complete the written survey that you may receive in the mail after your visit with us. Thank you!             Your Updated Medication List - Protect others around you: Learn how to safely use, store and throw away your medicines at www.disposemymeds.org.          This list is accurate as of: 10/10/17 11:59 PM.  Always use your most recent med list.                   Brand Name Dispense Instructions for use Diagnosis    AMOXICILLIN PO           carbidopa-levodopa  MG per tablet    SINEMET     Take 1 tablet by mouth 3 times daily        EXELON 9.5 MG/24HR 24 hr patch   Generic drug:  rivastigmine      Place 1 patch onto the skin daily        gabapentin 100 MG capsule    NEURONTIN    90 capsule    Take 1 capsule (100 mg) by mouth 3 times daily As needed for  Facial nerve pain    Trigeminal neuralgia       lisinopril 10 MG tablet    PRINIVIL/ZESTRIL    90 tablet    Take 1 tablet (10 mg) by mouth daily    Benign essential hypertension       memantine 10 MG tablet    NAMENDA    180 tablet    Take 1 tablet (10 mg) by mouth 2 times daily    Dementia without behavioral disturbance, unspecified dementia type       Probiotic 250 MG Caps           propranolol 10 MG tablet    INDERAL    180 tablet    Take 1 tablet (10 mg) by mouth 2 times daily    Tremor

## 2017-10-10 NOTE — TELEPHONE ENCOUNTER
Patient's  called to update that patient was seen in an ER setting last Wed in [facility]. Not a [facility] system. Patient was started on Amoxicillin 500 mg 1 tab PO TID (8am, 2pm, 8pm). On Friday patient started to have diarrhea post every abx dose.  called the after hours nursing line and was advised to start patient on probiotics. Patient's  is stating that the diarrhea has not stopped. Patient has no fever, no n/v, however, patient is not able to verbalize if she having UTI symptoms.  wanted to have patient seen by PCP prior to any new abx. Appointment scheduled with PCP for today.

## 2017-10-10 NOTE — NURSING NOTE
"Chief Complaint   Patient presents with     UTI     ED Follow up     Diarrhea       Initial /60  Pulse 58  Temp 98  F (36.7  C) (Oral)  Wt 114 lb (51.7 kg)  SpO2 96%  BMI 19.57 kg/m2 Estimated body mass index is 19.57 kg/(m^2) as calculated from the following:    Height as of 7/28/17: 5' 4\" (1.626 m).    Weight as of this encounter: 114 lb (51.7 kg).  Medication Reconciliation: complete  "

## 2017-10-10 NOTE — PROGRESS NOTES
"  SUBJECTIVE:   Roxanna Castorena is a 72 year old female who presents to clinic today for the following health issues:      Chief Complaint   Patient presents with     UTI     Diarrhea     Hx provided by  as pt has severe dementia and not able to provide hx  Was seen in ER in [place] for bladder infection within past 1 week and was admitted overnight and treated with IV abx and was then treated with outpt oral Amoxicilin. No records to review. Pt was mentally poorly responsive at  Time of admission before infection treated. Mental status now at baseline.   Started having diarrhea a few days ago. No diarrhea when  held Amoxicillin med today. Diarrhea 2-3x/day.  Pt eating normally per . Otherwise seems her usual self per . Urination frequency now baseline. No  Recent fever or visible chills    Pt's past medical history, family history, habits, medications and allergies were reviewed with the patient today.  See snap shot for  HCM status. Most recent lab results reviewed with pt. Problem list and histories reviewed & adjusted, as indicated.  Additional history as below:       Additional ROS:   Constitutional, HEENT, Cardiovascular, Pulmonary, GI and , Neuro, MSK and Psych review of systems/symptoms are otherwise negative or unchanged from previous, except as noted above.   Note that only able to do ROS for sx that pt's  able to view since pt has severe dementia and not reliable historian    OBJECTIVE:  /60  Pulse 58  Temp 98  F (36.7  C) (Oral)  Wt 114 lb (51.7 kg)  SpO2 96%  BMI 19.57 kg/m2   Estimated body mass index is 19.57 kg/(m^2) as calculated from the following:    Height as of 7/28/17: 5' 4\" (1.626 m).    Weight as of this encounter: 114 lb (51.7 kg).  Eye: PERRL, EOMI  HENT: ear canals and TM's normal and nose and mouth without ulcers or lesions   Neck: no adenopathy. Thyroid normal to palpation. No bruits  Pulm: Lungs clear to auscultation   CV: Regular rates " and rhythm  GI: Soft, nontender, Normal active bowel sounds, No hepatosplenomegaly or masses palpable  Ext: Peripheral pulses intact. No edema.  Neuro: Normal strength and tone, sensory exam grossly normal. 0/3 recall 5 min. Slow stable gait  Back: No CVAT    Assessment/Plan: (See plan discussion below for further details)  1. Bladder infection  Repeat UA to see if bladder infection still present given both previous IV and oral abx. Hold off on using further abx at this time pending UA results  - UA reflex to Microscopic and Culture  - Urine Microscopic    2. Diarrhea, unspecified type   Appears related to Amoxicillin since no diarrhea today when has not had med. Stop Amoxicillin. Continue probiotic daily for 2 weeks. If recurrent diarrhea off of Amoxicillin,  to contact clinic and will check c. diff labs     PLAN:  As above  Pt states he and pt prefer to get flu shot at [facility] and will do in the next 2 weeks    Lui Jeffrey MD  Internal Medicine Department  [facility]

## 2017-10-11 ENCOUNTER — TELEPHONE (OUTPATIENT)
Dept: INTERNAL MEDICINE | Facility: CLINIC | Age: 72
End: 2017-10-11

## 2017-10-11 NOTE — TELEPHONE ENCOUNTER
Reason for Call:  Request for results:    Name of test or procedure: urine test    Date of test of procedure: 10/10/2017    Location of the test or procedure: [facility] to leave the result message on voice mail or with a family member? YES    Phone number Patient can be reached at:  Other phone number:  [phone]--, Chon    Additional comments: Pt is leaving [facility] 10/12/17 and needs to know the results before then about a bladder infection.    Call taken on 10/11/2017 at 10:07 AM by SUSI DOUGLAS

## 2017-10-11 NOTE — TELEPHONE ENCOUNTER
See note attached to the urine lab test. I already sent message to nursing staff to call  and tell him that the urine was neg and no abx needed

## 2017-10-26 ENCOUNTER — APPOINTMENT (OUTPATIENT)
Dept: CT IMAGING | Facility: CLINIC | Age: 72
DRG: 057 | End: 2017-10-26
Attending: EMERGENCY MEDICINE
Payer: MEDICARE

## 2017-10-26 ENCOUNTER — APPOINTMENT (OUTPATIENT)
Dept: GENERAL RADIOLOGY | Facility: CLINIC | Age: 72
DRG: 057 | End: 2017-10-26
Attending: EMERGENCY MEDICINE
Payer: MEDICARE

## 2017-10-26 ENCOUNTER — TELEPHONE (OUTPATIENT)
Dept: INTERNAL MEDICINE | Facility: CLINIC | Age: 72
End: 2017-10-26

## 2017-10-26 ENCOUNTER — HOSPITAL ENCOUNTER (INPATIENT)
Facility: CLINIC | Age: 72
LOS: 5 days | Discharge: HOME HEALTH | DRG: 057 | End: 2017-11-01
Attending: EMERGENCY MEDICINE | Admitting: INTERNAL MEDICINE
Payer: MEDICARE

## 2017-10-26 DIAGNOSIS — S09.90XA CLOSED HEAD INJURY, INITIAL ENCOUNTER: ICD-10-CM
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
## 2017-10-26 DIAGNOSIS — M25.552 HIP PAIN, LEFT: ICD-10-CM

## 2017-10-26 PROBLEM — R29.6 FALLS: Status: ACTIVE | Noted: 2017-10-26

## 2017-10-26 LAB
ALBUMIN UR-MCNC: NEGATIVE MG/DL
ANION GAP SERPL CALCULATED.3IONS-SCNC: 4 MMOL/L (ref 3–14)
APPEARANCE UR: ABNORMAL
BASOPHILS # BLD AUTO: 0 10E9/L (ref 0–0.2)
BASOPHILS NFR BLD AUTO: 0.1 %
BILIRUB UR QL STRIP: NEGATIVE
BUN SERPL-MCNC: 24 MG/DL (ref 7–30)
CALCIUM SERPL-MCNC: 8.7 MG/DL (ref 8.5–10.1)
CHLORIDE SERPL-SCNC: 105 MMOL/L (ref 94–109)
CO2 SERPL-SCNC: 29 MMOL/L (ref 20–32)
COLOR UR AUTO: YELLOW
CREAT SERPL-MCNC: 0.82 MG/DL (ref 0.52–1.04)
DIFFERENTIAL METHOD BLD: ABNORMAL
EOSINOPHIL # BLD AUTO: 0 10E9/L (ref 0–0.7)
EOSINOPHIL NFR BLD AUTO: 0 %
ERYTHROCYTE [DISTWIDTH] IN BLOOD BY AUTOMATED COUNT: 12.8 % (ref 10–15)
GFR SERPL CREATININE-BSD FRML MDRD: 69 ML/MIN/1.7M2
GLUCOSE SERPL-MCNC: 121 MG/DL (ref 70–99)
GLUCOSE UR STRIP-MCNC: 150 MG/DL
HCT VFR BLD AUTO: 33 % (ref 35–47)
HGB BLD-MCNC: 11.2 G/DL (ref 11.7–15.7)
HGB UR QL STRIP: NEGATIVE
IMM GRANULOCYTES # BLD: 0.1 10E9/L (ref 0–0.4)
IMM GRANULOCYTES NFR BLD: 0.5 %
KETONES UR STRIP-MCNC: 5 MG/DL
LEUKOCYTE ESTERASE UR QL STRIP: NEGATIVE
LYMPHOCYTES # BLD AUTO: 0.4 10E9/L (ref 0.8–5.3)
LYMPHOCYTES NFR BLD AUTO: 4 %
MCH RBC QN AUTO: 34.7 PG (ref 26.5–33)
MCHC RBC AUTO-ENTMCNC: 33.9 G/DL (ref 31.5–36.5)
MCV RBC AUTO: 102 FL (ref 78–100)
MONOCYTES # BLD AUTO: 0.5 10E9/L (ref 0–1.3)
MONOCYTES NFR BLD AUTO: 4.7 %
MUCOUS THREADS #/AREA URNS LPF: PRESENT /LPF
NEUTROPHILS # BLD AUTO: 9.7 10E9/L (ref 1.6–8.3)
NEUTROPHILS NFR BLD AUTO: 90.7 %
NITRATE UR QL: NEGATIVE
NRBC # BLD AUTO: 0 10*3/UL
NRBC BLD AUTO-RTO: 0 /100
PH UR STRIP: 7 PH (ref 5–7)
PLATELET # BLD AUTO: 142 10E9/L (ref 150–450)
POTASSIUM SERPL-SCNC: 3.9 MMOL/L (ref 3.4–5.3)
RBC # BLD AUTO: 3.23 10E12/L (ref 3.8–5.2)
RBC #/AREA URNS AUTO: 2 /HPF (ref 0–2)
SODIUM SERPL-SCNC: 138 MMOL/L (ref 133–144)
SOURCE: ABNORMAL
SP GR UR STRIP: 1.02 (ref 1–1.03)
SQUAMOUS #/AREA URNS AUTO: 2 /HPF (ref 0–1)
UROBILINOGEN UR STRIP-MCNC: 0 MG/DL (ref 0–2)
WBC # BLD AUTO: 10.7 10E9/L (ref 4–11)
WBC #/AREA URNS AUTO: 1 /HPF (ref 0–2)

## 2017-10-26 PROCEDURE — G0378 HOSPITAL OBSERVATION PER HR: HCPCS

## 2017-10-26 PROCEDURE — 99220 ZZC INITIAL OBSERVATION CARE,LEVL III: CPT | Performed by: PHYSICIAN ASSISTANT

## 2017-10-26 PROCEDURE — 81001 URINALYSIS AUTO W/SCOPE: CPT | Performed by: EMERGENCY MEDICINE

## 2017-10-26 PROCEDURE — 93005 ELECTROCARDIOGRAM TRACING: CPT

## 2017-10-26 PROCEDURE — 73502 X-RAY EXAM HIP UNI 2-3 VIEWS: CPT

## 2017-10-26 PROCEDURE — 70450 CT HEAD/BRAIN W/O DYE: CPT

## 2017-10-26 PROCEDURE — 99285 EMERGENCY DEPT VISIT HI MDM: CPT | Mod: 25

## 2017-10-26 PROCEDURE — 99207 ZZC CDG-CODE CATEGORY CHANGED: CPT | Performed by: PHYSICIAN ASSISTANT

## 2017-10-26 PROCEDURE — 85025 COMPLETE CBC W/AUTO DIFF WBC: CPT | Performed by: EMERGENCY MEDICINE

## 2017-10-26 PROCEDURE — 80048 BASIC METABOLIC PNL TOTAL CA: CPT | Performed by: EMERGENCY MEDICINE

## 2017-10-26 RX ORDER — PROPRANOLOL HYDROCHLORIDE 10 MG/1
10 TABLET ORAL 2 TIMES DAILY
Status: DISCONTINUED | OUTPATIENT
Start: 2017-10-27 | End: 2017-11-01 | Stop reason: HOSPADM

## 2017-10-26 RX ORDER — MEMANTINE HYDROCHLORIDE 5 MG/1
10 TABLET ORAL 2 TIMES DAILY
Status: DISCONTINUED | OUTPATIENT
Start: 2017-10-27 | End: 2017-11-01 | Stop reason: HOSPADM

## 2017-10-26 RX ORDER — GABAPENTIN 100 MG/1
100 CAPSULE ORAL 3 TIMES DAILY PRN
Status: DISCONTINUED | OUTPATIENT
Start: 2017-10-27 | End: 2017-11-01 | Stop reason: HOSPADM

## 2017-10-26 RX ORDER — ONDANSETRON 4 MG/1
4 TABLET, ORALLY DISINTEGRATING ORAL EVERY 6 HOURS PRN
Status: DISCONTINUED | OUTPATIENT
Start: 2017-10-26 | End: 2017-11-01 | Stop reason: HOSPADM

## 2017-10-26 RX ORDER — BISACODYL 5 MG
5-15 TABLET, DELAYED RELEASE (ENTERIC COATED) ORAL DAILY PRN
Status: DISCONTINUED | OUTPATIENT
Start: 2017-10-26 | End: 2017-11-01 | Stop reason: HOSPADM

## 2017-10-26 RX ORDER — ACETAMINOPHEN 325 MG/1
650 TABLET ORAL EVERY 4 HOURS PRN
Status: DISCONTINUED | OUTPATIENT
Start: 2017-10-26 | End: 2017-10-27

## 2017-10-26 RX ORDER — NALOXONE HYDROCHLORIDE 0.4 MG/ML
.1-.4 INJECTION, SOLUTION INTRAMUSCULAR; INTRAVENOUS; SUBCUTANEOUS
Status: DISCONTINUED | OUTPATIENT
Start: 2017-10-26 | End: 2017-11-01 | Stop reason: HOSPADM

## 2017-10-26 RX ORDER — CARBIDOPA AND LEVODOPA 25; 100 MG/1; MG/1
1 TABLET ORAL 3 TIMES DAILY
Status: DISCONTINUED | OUTPATIENT
Start: 2017-10-26 | End: 2017-10-27

## 2017-10-26 RX ORDER — LISINOPRIL 10 MG/1
10 TABLET ORAL DAILY
Status: DISCONTINUED | OUTPATIENT
Start: 2017-10-27 | End: 2017-11-01 | Stop reason: HOSPADM

## 2017-10-26 RX ORDER — AMOXICILLIN 250 MG
1-2 CAPSULE ORAL 2 TIMES DAILY
Status: DISCONTINUED | OUTPATIENT
Start: 2017-10-26 | End: 2017-10-27

## 2017-10-26 RX ORDER — ONDANSETRON 2 MG/ML
4 INJECTION INTRAMUSCULAR; INTRAVENOUS EVERY 6 HOURS PRN
Status: DISCONTINUED | OUTPATIENT
Start: 2017-10-26 | End: 2017-11-01 | Stop reason: HOSPADM

## 2017-10-26 RX ORDER — RIVASTIGMINE 9.5 MG/24H
1 PATCH, EXTENDED RELEASE TRANSDERMAL DAILY
Status: DISCONTINUED | OUTPATIENT
Start: 2017-10-27 | End: 2017-11-01 | Stop reason: HOSPADM

## 2017-10-26 NOTE — TELEPHONE ENCOUNTER
Pt's spouse called clinic today  He is thinking maybe pt needs an assessment  She has been falling a lot, it's getting hard for him  Chon was advised he would receive a call from [facility] to assess pt's needs and resources  Please call sooner than later on Chon's cell phone [phone]- [phone] to leave detailed message

## 2017-10-26 NOTE — IP AVS SNAPSHOT
` ` Patient Information     Patient Name Sex     Roxanna Castorena ([id number]) Female 1945       Room Bed    [facility] 0545-01      Patient Demographics     Address Phone    [address] [place] [address] [phone] (Home)  NONE (Work)  [phone] (Mobile) *Preferred*      Patient Ethnicity & Race     Ethnic Group Patient Race    American White      Emergency Contact(s)     Name Relation Home Work Mobile    Chon Castorena Spouse [phone]      Documents on File        Status Date Received Description       Documents for the Patient    Face Sheet  () 09     Insurance Card  03     Privacy Notice - [place] Received 09/10/13     Face Sheet Received () 05/14/10     External Medication Information Consent Accepted () 09/11/10     Patient ID       Consent for Services - Hospital/Clinic Received () 11     Consent to Communicate Received () 11     External Medication Information Consent Accepted () 11     Other Received 11     Insurance Card Received () 12     Consent for Services - Hospital/Clinic Received () 12     Insurance Card Received 13 Medicare    External Medication Information Consent Accepted 12     Consent for EHR Access  13 Copied from existing Consent for services - C/HOD collected on 2012    [facility] Specified Other       Consent for Services - Hospital/Clinic Received () 13     Insurance Card Received 13 BC    Insurance Card       Speech Therapy Certification Received 10/16/13 9/30/13-13    Consent to Communicate Received 13     Advance Directives and Living Will Received 13 Health Care Directive 12..13    Advance Directives and Living Will Received 13 Validation of AD 12.31.13    Consent for Services - Hospital/Clinic Received () 14     Insurance Card Received 10/09/14     Consent  for Services - Hospital/Clinic Received () 05/31/15     Consent for Services/Privacy Notice - Hospital/Clinic Received () 16     Insurance Card Received 16 BCBS    Consent for Services/Privacy Notice - Hospital/Clinic Received 17     Insurance Card Received 17 BCBS    Care Everywhere Prospective Auth       Care Everywhere Prospective Auth Received 10/26/17     Advance Directives and Living Will Received 10/30/17 POLST 10/30/2017       Documents for the Encounter    CMS IM for Patient Signature Received 10/29/17 reviewed with spouse      Admission Information     Attending Provider Admitting Provider Admission Type Admission Date/Time    Shakila Oconnell, DO Shakila Oconnell, DO Emergency 10/26/17  1556    Discharge Date Hospital Service Auth/Cert Status Service Area     Hospitalist [facility] SERVICES    Unit Room/Bed Admission Status        5 MEDICAL SURGICAL 0545/0545-01 Admission (Confirmed)       Admission     Complaint    Falls, Fall      Hospital Account     Name Acct ID Class Status Primary Coverage    Roxanna Castorena [id number] Inpatient Open MEDICARE - MEDICARE            Guarantor Account (for Hospital Account #[id number])     Name Relation to Pt Service Area Active? Acct Type    Roxanna Castorena Self FCS Yes Personal/Family    Address Phone          [address] [place] [address] [phone](H)  NONE(O)              Coverage Information (for Hospital Account #[id number])     1. MEDICARE/MEDICARE     F/O Payor/Plan Precert #    MEDICARE/MEDICARE     Subscriber Subscriber #    Roxanna Castorena [id number]    Address Phone    [employer]  [address]  [place] [address] [phone]          2. [employer]     F/O Payor/Plan Precert #    BCBS/[employer] PROGRAM     Subscriber Subscriber #    Chon Castorena [id number]    Address Phone    [address] [address]  [place] [address] [phone]

## 2017-10-26 NOTE — IP AVS SNAPSHOT
[place] [address] [facility]    [address]    [facility] [address]    Phone:  [phone]    Fax:  [phone]                                       After Visit Summary   10/26/2017    Roxanna Castorena    MRN: [id number]           After Visit Summary Signature Page     I have received my discharge instructions, and my questions have been answered. I have discussed any challenges I see with this plan with the nurse or doctor.    ..........................................................................................................................................  Patient/Patient Representative Signature      ..........................................................................................................................................  Patient Representative Print Name and Relationship to Patient    ..................................................               ................................................  Date                                            Time    ..........................................................................................................................................  Reviewed by Signature/Title    ...................................................              ..............................................  Date                                                            Time

## 2017-10-26 NOTE — TELEPHONE ENCOUNTER
Routed this to [facility] care coordinator   This encounter closed     Lyndsey Turner RN / Clinical Care Coordinator     [place] [address]  [email or web address]  Office :  [phone] / Fax :  [phone]

## 2017-10-26 NOTE — IP AVS SNAPSHOT
` `     [place] [address] [facility]: [phone]            Medication Administration Report for Roxanna Castorena as of 11/01/17 0937   Legend:    Given Hold Not Given Due Canceled Entry Other Actions    Time Time (Time) Time  Time-Action       Inactive    Active    Linked        Medications 10/26/17 10/27/17 10/28/17 10/29/17 10/30/17 10/31/17 11/01/17    acetaminophen (TYLENOL) tablet 650 mg  Dose: 650 mg Freq: EVERY 4 HOURS PRN Route: PO  PRN Reason: mild pain  Start: 10/27/17 0246   Admin Instructions: Maximum acetaminophen dose from all sources = 75 mg/kg/day not to exceed 4 grams/day.              0941 (650 mg)-Given              Or  acetaminophen (TYLENOL) Suppository 650 mg  Dose: 650 mg Freq: EVERY 4 HOURS PRN Route: RE  PRN Reason: mild pain  Start: 10/27/17 0246   Admin Instructions: Maximum acetaminophen dose from all sources = 75 mg/kg/day not to exceed 4 grams/day.      0347 (650 mg)-Given                       bisacodyl (DULCOLAX) EC tablet 5-15 mg  Dose: 5-15 mg Freq: DAILY PRN Route: PO  PRN Reason: constipation  PRN Comment:    Start: 10/26/17 2102   Admin Instructions: Do not crush or chew. Swallow whole. May titrate 1 tablet each day until response. Maximum of 3 tablets daily. Hold for loose stools.  This is the first step of a three step constipation treatment protocol.               camphor-menthol (DERMASARRA) lotion  Freq: EVERY 6 HOURS PRN Route: Top  PRN Reason: skin care  Start: 10/27/17 1742   Admin Instructions: Apply to itchy skin               carbidopa-levodopa (PARCOPA)  MG per ODT tab 1 tablet  Dose: 1 tablet Freq: 3 TIMES DAILY Route: PO  Start: 10/27/17 1000   Admin Instructions: DO NOT CRUSH.      1026 (1 tablet)-Given       1632 (1 tablet)-Given       2001 (1 tablet)-Given        0940 (1 tablet)-Given       1311 (1 tablet)-Given       2000 (1 tablet)-Given        0908 (1 tablet)-Given       1545 (1 tablet)-Given       2230 (1 tablet)-Given        0913 (1  tablet)-Given       1413 (1 tablet)-Given       2108 (1 tablet)-Given        0822 (1 tablet)-Given       1333 (1 tablet)-Given       1958 (1 tablet)-Given        0836 (1 tablet)-Given       [ ] 1400       [ ] 2000           gabapentin (NEURONTIN) capsule 100 mg  Dose: 100 mg Freq: 3 TIMES DAILY PRN Route: PO  PRN Comment: facial nerve pain  Start: 10/27/17 0038              hydrALAZINE (APRESOLINE) injection 10 mg  Dose: 10 mg Freq: EVERY 4 HOURS PRN Route: IV  PRN Reason: high blood pressure  PRN Comment: give for SBP > 180  Start: 10/27/17 0246   Admin Instructions: Give IV Push undiluted over 1 minute up to 40 mg.               lisinopril (PRINIVIL/ZESTRIL) tablet 10 mg  Dose: 10 mg Freq: DAILY Route: PO  Start: 10/27/17 1100   Admin Instructions: Hold if sbp <110      (1100)-Not Given [C]        0940 (10 mg)-Given        0908 (10 mg)-Given        0913 (10 mg)-Given        0822 (10 mg)-Given        0836 (10 mg)-Given           memantine (NAMENDA) tablet 10 mg  Dose: 10 mg Freq: 2 TIMES DAILY Route: PO  Start: 10/27/17 0039     (0101)-Not Given       (0800)-Not Given [C]       2001 (10 mg)-Given        0939 (10 mg)-Given       2000 (10 mg)-Given        0908 (10 mg)-Given       2231 (10 mg)-Given        0913 (10 mg)-Given       2108 (10 mg)-Given        0822 (10 mg)-Given       1957 (10 mg)-Given        0828 (10 mg)-Given       [ ] 2000           naloxone (NARCAN) injection 0.1-0.4 mg  Dose: 0.1-0.4 mg Freq: EVERY 2 MIN PRN Route: IV  PRN Reason: opioid reversal  Start: 10/26/17 2102   Admin Instructions: For respiratory rate LESS than or EQUAL to 8.  Partial reversal dose:  0.1 mg titrated q 2 minutes for Analgesia Side Effects Monitoring Sedation Level of 3 (frequently drowsy, arousable, drifts to sleep during conversation).Full reversal dose:  0.4 mg bolus for Analgesia Side Effects Monitoring Sedation Level of 4 (somnolent, minimal or no response to stimulation).  Give IV Push undiluted up to 2mg. Give each  0.4mg over 15 seconds in emergency situations. For non-emergent situations further dilute in 9mL of NS to facilitate titration of response.               ondansetron (ZOFRAN-ODT) ODT tab 4 mg  Dose: 4 mg Freq: EVERY 6 HOURS PRN Route: PO  PRN Reasons: nausea,vomiting  Start: 10/26/17 2102   Admin Instructions: This is Step 1 of nausea and vomiting management.  If nausea not resolved in 15 minutes, go to Step 2 prochlorperazine (COMPAZINE). Do not push through foil backing. Peel back foil and gently remove. Place on tongue immediately. Administration with liquid unnecessary              Or  ondansetron (ZOFRAN) injection 4 mg  Dose: 4 mg Freq: EVERY 6 HOURS PRN Route: IV  PRN Reasons: nausea,vomiting  Start: 10/26/17 2102   Admin Instructions: This is Step 1 of nausea and vomiting management.  If nausea not resolved in 15 minutes, go to Step 2 prochlorperazine (COMPAZINE).  Irritant. Give IV Push undiluted over 2-5 minutes up to 4 mg.               oxyCODONE (ROXICODONE) IR tablet 5 mg  Dose: 5 mg Freq: EVERY 3 HOURS PRN Route: PO  PRN Reason: moderate to severe pain  Start: 10/28/17 1326              propranolol (INDERAL) tablet 10 mg  Dose: 10 mg Freq: 2 TIMES DAILY Route: PO  Start: 10/27/17 0039   Admin Instructions: Hold if sbp <110 or hr <60      (0102)-Not Given       (0800)-Not Given [C]       2001 (10 mg)-Given        0940 (10 mg)-Given       2001 (10 mg)-Given        0909 (10 mg)-Given       2230 (10 mg)-Given        0913 (10 mg)-Given       2108 (10 mg)-Given        0822 (10 mg)-Given       1958 (10 mg)-Given        0828 (10 mg)-Given       [ ] 2000           rivastigmine (EXELON) 9.5 MG/24HR 24 hr patch 1 patch  Dose: 1 patch Freq: DAILY Route: TD  Start: 10/27/17 1100     (1100)-Not Given [C]       1927 (1 patch)-Given        0940 (1 patch)-Given        0909 (1 patch)-Given        0913 (1 patch)-Given        0822 (1 patch)-Given        0836 (1 patch)-Given           rivastigmine (EXELON) Patch in  Place  Freq: EVERY 8 HOURS Route: TD  Start: 10/27/17 1100   Admin Instructions: Chart every shift, confirming that patch is still in place on patient (no barcode scan needed). See patch order for dose information.      (1100)-Not Given [C]       1948 ( )-Patch in Place        0336 ( )-Patch in Place       1111 ( )-Patch in Place       1839 ( )-Patch in Place        0219 ( )-Patch in Place       1035 (1 each)-Patch in Place       1839 ( )-Patch in Place        0222 ( )-Patch in Place       1028 (1 each)-Patch in Place       1801 ( )-Patch in Place        0247 ( )-Patch in Place       1008 ( )-Patch in Place       2014 ( )-Patch in Place        0203 ( )-Patch in Place       [ ] 1100       [ ] 1900           rivastigmine (EXELON) patch REMOVAL  Freq: DAILY Route: TD  Start: 10/27/17 1100   Admin Instructions: Remove patch when new patch is applied or patch is discontinued.      1220 ( )-Patch Removed [C]        0939 ( )-Patch Removed        0908 (1 each)-Patch Removed        0910 (1 each)-Patch Removed        0823 ( )-Given        0836 ( )-Patch Removed          Completed Medications  Medications 10/26/17 10/27/17 10/28/17 10/29/17 10/30/17 10/31/17 11/01/17         Dose: 0.5 mL Freq: PRIOR TO DISCHARGE Route: IM  Start: 10/31/17 1000   End: 11/01/17 0930   Admin Instructions: Administer when afebrile (less than 100.4F) x 24 hours, or Prior to Discharge.  If not administering when scheduled , change the due time by following the instructions in the reference link below.  If patient refuses vaccine, chart as Vaccine Refused.           0930 (0.5 mL)-Given       [ ] 1330          Discontinued Medications  Medications 10/26/17 10/27/17 10/28/17 10/29/17 10/30/17 10/31/17 11/01/17         Rate: 50 mL/hr Freq: CONTINUOUS Route: IV  Last Dose: Stopped (10/29/17 1310)  Start: 10/27/17 0927   End: 10/29/17 1124     1204 ( )-New Bag       2147 ( )-New Bag        1730 ( )-New Bag        1124-Med Discontinued  1310-Stopped

## 2017-10-26 NOTE — IP AVS SNAPSHOT
"    [place] [address] MEDICAL SURGICAL: [phone]                                              INTERAGENCY TRANSFER FORM - LAB / IMAGING / EKG / EMG RESULTS   10/26/2017                    Hospital Admission Date: 10/26/2017  SAUL ESPARZA   : 1945  Sex: Female        Attending Provider: Shakila Oconnell DO     Allergies:  Donepezil, Lyrica [Pregabalin]    Infection:  None   Service:  HOSPITALIST    Ht:  1.676 m (5' 6\")   Wt:  50.2 kg (110 lb 11.2 oz)   Admission Wt:  50.3 kg (111 lb)    BMI:  17.87 kg/m 2   BSA:  1.53 m 2            Patient PCP Information     Provider PCP Type    Lui Jeffrey MD General         Lab Results - 3 Days      UA with Microscopic reflex to Culture [[id number]] (Abnormal)  Resulted: 17 022, Result status: Final result    Ordering provider: Deshawn Christina MD  17 0131 Resulting lab: [facility]    Specimen Information    Type Source Collected On   Catheterized Urine Urine catheter 17 0045          Components       Value Reference Range Flag Lab   Color Urine Yellow   FrRdHs   Appearance Urine Clear   FrRdHs   Glucose Urine Negative NEG^Negative mg/dL  FrRdHs   Bilirubin Urine Negative NEG^Negative  FrRdHs   Ketones Urine Negative NEG^Negative mg/dL  FrRdHs   Specific Gravity Urine 1.018 1.003 - 1.035  FrRdHs   Blood Urine Small NEG^Negative A FrRdHs   pH Urine 6.0 5.0 - 7.0 pH  FrRdHs   Protein Albumin Urine Negative NEG^Negative mg/dL  FrRdHs   Urobilinogen mg/dL 2.0 0.0 - 2.0 mg/dL  FrRdHs   Nitrite Urine Negative NEG^Negative  FrRdHs   Leukocyte Esterase Urine Negative NEG^Negative  FrRdHs   Source Catheterized Urine   FrRdHs   WBC Urine 1 0 - 2 /HPF  FrRdHs   RBC Urine 2 0 - 2 /HPF  FrRdHs   Squamous Epithelial /HPF Urine <1 0 - 1 /HPF  FrRdHs   Mucous Urine Present NEG^Negative /LPF A FrRdHs   Hyaline Casts 1 0 - 2 /LPF  FrRdHs            Testing Performed By     Lab - Abbreviation Name Director Address Valid Date Range    12 - " [facility] Unknown [address]  [facility] [address] 05/08/15 1057 - Present            Unresulted Labs     None      Encounter-Level Documents:     There are no encounter-level documents.      Order-Level Documents:     There are no order-level documents.

## 2017-10-26 NOTE — IP AVS SNAPSHOT
MRN:[id number]                      After Visit Summary   10/26/2017    Roxanna Castorena    MRN: [id number]           Thank you!     Thank you for choosing [facility] for your care. Our goal is always to provide you with excellent care. Hearing back from our patients is one way we can continue to improve our services. Please take a few minutes to complete the written survey that you may receive in the mail after you visit. If you would like to speak to someone directly about your visit please contact Patient Relations at [phone]. Thank you!          Patient Information     Date Of Birth          1945        About your hospital stay     You were admitted on:  October 26, 2017 You last received care in the:  [address] [facility]    You were discharged on:  November 1, 2017        Reason for your hospital stay       Fall                  Who to Call     For medical emergencies, please call 911.  For non-urgent questions about your medical care, please call your primary care provider or clinic, [phone]          Attending Provider     Provider Specialty    Laureano Iglesias MD Emergency Medicine    Shakila Oconnell DO Internal Medicine       Primary Care Provider Office Phone # Fax #    Lui Jeffrey [phone]      After Care Instructions     Activity       Your activity upon discharge: activity as tolerated            Diet       Follow this diet upon discharge: Orders Placed This Encounter      Room Service      Combination Diet Dysphagia Diet Level 1: Pureed; Honey Thickened Liquids (pre-thickened or use instant food thickener) (by spoon only)                  Follow-up Appointments     Follow-up and recommended labs and tests        Follow up with primary care provider, Lui Jeffrey, within 7 days for hospital follow- up.  No follow up labs or test are needed.                  Your next 10 appointments already scheduled      Jan 08, 2018  1:00 PM CST   Return Visit with RH ONCOLOGY NURSE   [facility] ([facility] [place])    [facility]  [address]  [facility] [id number] [phone]            Marquis 15, 2018  2:00 PM CST   Return Visit with Amber Villagran MD   [facility] ([facility] [place])    [facility]  [address]  [facility] [id number] [phone]              Additional Services     Home Care OT Referral for Hospital Discharge       OT to eval and treat    Your provider has ordered home care - occupational therapy. If you have not been contacted within 2 days of your discharge please call the department phone number listed on the top of this document.            Home Care PT Referral for Hospital Discharge       PT to eval and treat    Your provider has ordered home care - physical therapy. If you have not been contacted within 2 days of your discharge please call the department phone number listed on the top of this document.            Home Care SLP Referral for Hospital Discharge       SLP to eval and treat    Your provider has ordered home care - speech therapy. If you have not been contacted within 2 days of your discharge please call the department phone number listed on the top of this document.                  Pending Results     No orders found from 10/24/2017 to 10/27/2017.            Statement of Approval     Ordered          10/31/17 1607  I have reviewed and agree with all the recommendations and orders detailed in this document.  EFFECTIVE NOW     Approved and electronically signed by:  Alfredo Maier MD             Admission Information     Date & Time Provider Department Dept. Phone    10/26/2017 Shakila Oconnell DO [place] [address] Medical Surgical [phone]      Your Vitals Were     Blood Pressure Pulse Temperature Respirations Height Weight    161/70 (BP  "Location: Left arm) 81 97.5  F (36.4  C) (Axillary) 18 1.676 m (5' 6\") 50.2 kg (110 lb 11.2 oz)    Pulse Oximetry BMI (Body Mass Index)                96% 17.87 kg/m2          Mel[employer] Information     [employer] lets you send messages to your doctor, view your test results, renew your prescriptions, schedule appointments and more. To sign up, go to [email or web address] . Click on \"Log in\" on the left side of the screen, which will take you to the Welcome page. Then click on \"Sign up Now\" on the right side of the page.     You will be asked to enter the access code listed below, as well as some personal information. Please follow the directions to create your username and password.     Your access code is: [email or web address]  Expires: 2018 11:33 PM     Your access code will  in 90 days. If you need help or a new code, please call your [place] clinic or [phone].        Care EveryWhere ID     This is your Care EveryWhere ID. This could be used by other organizations to access your [place] medical records  [id number]        Equal Access to Services     MAGO RODRIGUEZ AH: Lane Jones, waaudrey paul, qaybta kaalmada rubén, claudia hector. So [facility] [phone].    ATENCIÓN: Si habla español, tiene a nicole disposición servicios gratuitos de asistencia lingüística. Llame al [phone].    We comply with applicable federal civil rights laws and [place] laws. We do not discriminate on the basis of race, color, national origin, age, disability, sex, sexual orientation, or gender identity.               Review of your medicines      CONTINUE these medicines which have NOT CHANGED        Dose / Directions    carbidopa-levodopa  MG per tablet   Commonly known as:  SINEMET        Dose:  1 tablet   Take 1 tablet by mouth 3 times daily At 0700, 1300, and 1800   Refills:  0       EXELON 9.5 MG/24HR 24 hr patch   Generic drug:  rivastigmine        Dose:  1 patch   Place " 1 patch onto the skin daily   Refills:  0       gabapentin 100 MG capsule   Commonly known as:  NEURONTIN   Used for:  Trigeminal neuralgia        Dose:  100 mg   Take 1 capsule (100 mg) by mouth 3 times daily As needed for  Facial nerve pain   Quantity:  90 capsule   Refills:  11       lisinopril 10 MG tablet   Commonly known as:  PRINIVIL/ZESTRIL   Used for:  Benign essential hypertension        Dose:  10 mg   Take 1 tablet (10 mg) by mouth daily   Quantity:  90 tablet   Refills:  3       memantine 10 MG tablet   Commonly known as:  NAMENDA   Used for:  Dementia without behavioral disturbance, unspecified dementia type        Dose:  10 mg   Take 1 tablet (10 mg) by mouth 2 times daily   Quantity:  180 tablet   Refills:  3       propranolol 10 MG tablet   Commonly known as:  INDERAL   Used for:  Tremor        Dose:  10 mg   Take 1 tablet (10 mg) by mouth 2 times daily   Quantity:  180 tablet   Refills:  3                Protect others around you: Learn how to safely use, store and throw away your medicines at www.disposemymeds.org.             Medication List: This is a list of all your medications and when to take them. Check marks below indicate your daily home schedule. Keep this list as a reference.      Medications           Morning Afternoon Evening Bedtime As Needed    carbidopa-levodopa  MG per tablet   Commonly known as:  SINEMET   Take 1 tablet by mouth 3 times daily At 0700, 1300, and 1800   Last time this was given:   mg on 11/1/2017  8:36 AM   Next Dose Due:  This afternoon, Wednesday 11/1/2017, around 1 PM and 6 PM.                                 EXELON 9.5 MG/24HR 24 hr patch   Place 1 patch onto the skin daily   Last time this was given:  1 patch on 11/1/2017  8:36 AM   Generic drug:  rivastigmine   Next Dose Due:  Tomorrow morning, Thursday 11/2/2017                                gabapentin 100 MG capsule   Commonly known as:  NEURONTIN   Take 1 capsule (100 mg) by mouth 3 times daily  As needed for  Facial nerve pain   Last time this was given:  Not given during hospitalization.    Next Dose Due:  Take AS NEEDED, as prescribed.                                    lisinopril 10 MG tablet   Commonly known as:  PRINIVIL/ZESTRIL   Take 1 tablet (10 mg) by mouth daily   Last time this was given:  10 mg on 11/1/2017  8:36 AM   Next Dose Due:  Tomorrow morning, Thursday 11/2/2017                                memantine 10 MG tablet   Commonly known as:  NAMENDA   Take 1 tablet (10 mg) by mouth 2 times daily   Last time this was given:  10 mg on 11/1/2017  8:28 AM   Next Dose Due:  Tonight, Wednesday 11/1/2017, around 8 PM.                                 propranolol 10 MG tablet   Commonly known as:  INDERAL   Take 1 tablet (10 mg) by mouth 2 times daily   Last time this was given:  10 mg on 11/1/2017  8:28 AM   Next Dose Due:  Tonight, Wednesday 11/1/2017, around 8 PM.

## 2017-10-26 NOTE — IP AVS SNAPSHOT
"[place] [address] MEDICAL SURGICAL: [phone]                                              INTERAGENCY TRANSFER FORM - PHYSICIAN ORDERS   10/26/2017                    Hospital Admission Date: 10/26/2017  SAUL ESPARZA   : 1945  Sex: Female        Attending Provider: Shakila Oconnell DO     Allergies:  Donepezil, Lyrica [Pregabalin]    Infection:  None   Service:  HOSPITALIST    Ht:  1.676 m (5' 6\")   Wt:  50.2 kg (110 lb 11.2 oz)   Admission Wt:  50.3 kg (111 lb)    BMI:  17.87 kg/m 2   BSA:  1.53 m 2            Patient PCP Information     Provider PCP Type    Lui Jeffrey MD General      ED Clinical Impression     Diagnosis Description Comment Added By Time Added    Fall, initial encounter [W19.XXXA] Fall, initial encounter [W19.XXXA]  Laureano Iglesias MD 10/26/2017  6:15 PM    Hip pain, left [M25.552] Hip pain, left [M25.552]  Laureano Iglesias MD 10/26/2017  6:15 PM    Closed head injury, initial encounter [S09.90XA] Closed head injury, initial encounter [S09.90XA]  Laureano Iglesias MD 10/26/2017  6:16 PM      Hospital Problems as of 2017              Priority Class Noted POA    Fall Medium  2017 Yes    Falls Medium  10/26/2017 Yes      Non-Hospital Problems as of 2017              Priority Class Noted    Migraine with aura Medium  2003    CARDIOVASCULAR SCREENING; LDL GOAL LESS THAN 160 Medium  10/31/2010    Cerebral aneurysm Medium  2011    Dementia Medium  Unknown    ACP (advance care planning) Medium  2012    HTN (hypertension) Medium  2012    Monoclonal gammopathy Medium  Unknown    Colon polyps Medium  2013    Osteopenia Medium  10/31/2013    Dysphagia Medium  10/31/2013    Tremor Medium  10/31/2013    CKD (chronic kidney disease) stage 3, GFR 30-59 ml/min Medium  9/10/2014    Anemia Medium  2014    Dementia without behavioral disturbance Medium  2015      Code Status History     Date Active Date Inactive Code " Status Order ID Comments User Context    10/31/2017  4:07 PM  DNR/DNI [id number]  Alfredo Maier MD Outpatient    10/26/2017  9:02 PM 10/31/2017  4:07 PM DNR/DNI [id number]  Yudith Sanchez PA-C Inpatient    7/29/2017 12:09 PM 10/26/2017  9:02 PM DNR [id number]  Linda Plaza PA-C Outpatient    7/28/2017  5:18 PM 7/29/2017 12:09 PM DNR [id number]  Claudette Emmanuel PA-C Inpatient    7/28/2017  4:57 PM 7/28/2017  5:18 PM Full Code [id number]  Claudette Emmanuel PA-C Inpatient    12/31/2013  1:47 PM 7/28/2017  4:57 PM Full Code [id number] See Health Care Directive dated 12/31/2013  JOHN [facility] Outpatient         Medication Review      CONTINUE these medications which have NOT CHANGED        Dose / Directions Comments    carbidopa-levodopa  MG per tablet   Commonly known as:  SINEMET        Dose:  1 tablet   Take 1 tablet by mouth 3 times daily At 0700, 1300, and 1800   Refills:  0        EXELON 9.5 MG/24HR 24 hr patch   Generic drug:  rivastigmine        Dose:  1 patch   Place 1 patch onto the skin daily   Refills:  0        gabapentin 100 MG capsule   Commonly known as:  NEURONTIN   Used for:  Trigeminal neuralgia        Dose:  100 mg   Take 1 capsule (100 mg) by mouth 3 times daily As needed for  Facial nerve pain   Quantity:  90 capsule   Refills:  11        lisinopril 10 MG tablet   Commonly known as:  PRINIVIL/ZESTRIL   Used for:  Benign essential hypertension        Dose:  10 mg   Take 1 tablet (10 mg) by mouth daily   Quantity:  90 tablet   Refills:  3    Profile only. Pt doesn't require refill at this time       memantine 10 MG tablet   Commonly known as:  NAMENDA   Used for:  Dementia without behavioral disturbance, unspecified dementia type        Dose:  10 mg   Take 1 tablet (10 mg) by mouth 2 times daily   Quantity:  180 tablet   Refills:  3    Profile only. Pt doesn't require refill at this time       propranolol 10 MG tablet   Commonly known as:   INDERAL   Used for:  Tremor        Dose:  10 mg   Take 1 tablet (10 mg) by mouth 2 times daily   Quantity:  180 tablet   Refills:  3                Summary of Visit     Reason for your hospital stay       Fall             After Care     Activity       Your activity upon discharge: activity as tolerated       Diet       Follow this diet upon discharge: Orders Placed This Encounter      Room Service      Combination Diet Dysphagia Diet Level 1: Pureed; Honey Thickened Liquids (pre-thickened or use instant food thickener) (by spoon only)             Referrals     Home Care OT Referral for Hospital Discharge       OT to [facility] and treat    Your provider has ordered home care - occupational therapy. If you have not been contacted within 2 days of your discharge please call the department phone number listed on the top of this document.       Home Care PT Referral for Hospital Discharge       PT to [facility] and treat    Your provider has ordered home care - physical therapy. If you have not been contacted within 2 days of your discharge please call the department phone number listed on the top of this document.       Home Care SLP Referral for Hospital Discharge       SLP to [facility] and treat    Your provider has ordered home care - speech therapy. If you have not been contacted within 2 days of your discharge please call the department phone number listed on the top of this document.              MD face to face encounter       Documentation of Face to Face and Certification for Home Health Services    I certify that patient: Roxanna Castorena is under my care and that I, or a nurse practitioner or physician's assistant working with me, had a face-to-face encounter that meets the physician face-to-face encounter requirements with this patient on: 10/31/2017.    This encounter with the patient was in whole, or in part, for the following medical condition, which is the primary reason for home health care: recurrent falls .    I  certify that, based on my findings, the following services are medically necessary home health services: Occupational Therapy, Physical Therapy and Speech Language Therapy.    My clinical findings support the need for the above services because: Occupational Therapy Services are needed to assess and treat cognitive ability and address ADL safety due to impairment in Mobility -ADL ., Physical Therapy Services are needed to assess and treat the following functional impairments: Mobility . and Speech Therapy Services are needed to assess and treat impairments in language and/or swallow functions due to  Dysphagia .    Further, I certify that my clinical findings support that this patient is homebound (i.e. absences from home require considerable and taxing effort and are for medical reasons or [facility] services or infrequently or of short duration when for other reasons) because: Leaving home is medically contraindicated for the following reason(s): Other physician ordered restriction: fall , parkinsonn's...    Based on the above findings. I certify that this patient is confined to the home and needs intermittent skilled nursing care, physical therapy and/or speech therapy.  The patient is under my care, and I have initiated the establishment of the plan of care.  This patient will be followed by a physician who will periodically review the plan of care.  Physician/Provider to provide follow up care: Lui Jeffrey    Attending hospital physician (the Medicare certified [place] provider): Alfredo Maier  Physician Signature: See electronic signature associated with these discharge orders.  Date: 10/31/2017                  Your next 10 appointments already scheduled     Jan 08, 2018  1:00 PM CST   Return Visit with  ONCOLOGY NURSE   [facility] ([facility])    [facility]  [address]  [facility] [address]   [phone]            Marquis 15,  2018  2:00 PM CST   Return Visit with Abmer Villagran MD   [facility] ([facility])    [facility]  [address]  [facility] [address] [phone]              Follow-Up Appointment Instructions     Future Labs/Procedures    Follow-up and recommended labs and tests      Comments:    Follow up with primary care provider, Lui Jeffrey, within 7 days for hospital follow- up.  No follow up labs or test are needed.      Follow-Up Appointment Instructions     Follow-up and recommended labs and tests        Follow up with primary care provider, Lui Jeffrey, within 7 days for hospital follow- up.  No follow up labs or test are needed.             Statement of Approval     Ordered          10/31/17 1607  I have reviewed and agree with all the recommendations and orders detailed in this document.  EFFECTIVE NOW     Approved and electronically signed by:  Alfredo Maire MD

## 2017-10-26 NOTE — ED NOTES
[facility]  ED Nurse Handoff Report    Roxanna Castorena is a 72 year old female   ED Chief complaint: Fall  . ED Diagnosis:   Final diagnoses:   Fall, initial encounter   Hip pain, left   Closed head injury, initial encounter     Allergies:   Allergies   Allergen Reactions     Donepezil      Hot flashes     Lyrica [Pregabalin] Hives       Code Status: DNR  Activity level - Baseline/Home:  Stand with Assist of 2. Activity Level - Current:   Stand with Assist of 2. Lift room needed: No. Bariatric: No   Needed: No   Isolation: No. Infection: Not Applicable.     Vital Signs:   Vitals:    10/26/17 1740 10/26/17 1741 10/26/17 1745 10/26/17 1800   BP: (!) 201/98   (!) 184/113   Pulse:       Resp:       Temp:       TempSrc:       SpO2:  95% 97%    Weight:       Height:           Cardiac Rhythm:  ,      Pain level:    Patient confused: Yes. Patient Falls Risk: Yes.   Elimination Status: Urethral catheter (munguia) in place; refer to orders to discontinue as per protocol    Patient Report - Initial Complaint: Fall . Focused Assessment:  Musculoskeletal - Side: Left Pain Body Location: hip CMS Intact: Yes Musculoskeletal Comment: Pt c/o left hip pain after a fall.  states that she has been falling quite a bit lately and recently had an appointment with assisted living/memory care. Pt was walking it earlier.  Tests Performed:  CT Head w/o Contrast   Final Result   IMPRESSION:    1. No acute abnormality.   2. Generalized atrophy of the brain.      STANTON BURGOS MD      XR Pelvis and Hip Left 2 Views   Final Result   IMPRESSION:  No evidence of left hip fracture. Right femur is not well   profiled probably due to flexion, and it would be difficult to rule   out fracture in the right femur.      STANTON BURGOS MD        Labs Ordered and Resulted from Time of ED Arrival Up to the Time of Departure from the ED   CBC WITH PLATELETS DIFFERENTIAL - Abnormal; Notable for the following:        Result Value     RBC Count 3.23 (*)     Hemoglobin 11.2 (*)     Hematocrit 33.0 (*)      (*)     MCH 34.7 (*)     Platelet Count 142 (*)     Absolute Neutrophil 9.7 (*)     Absolute Lymphocytes 0.4 (*)     All other components within normal limits   BASIC METABOLIC PANEL - Abnormal; Notable for the following:     Glucose 121 (*)     All other components within normal limits   ROUTINE UA WITH MICROSCOPIC   PERIPHERAL IV CATHETER   CONTINUE INDWELLING URINARY CATHETER (REARDON)       Treatments provided: Monitor  Family Comments:  was at bedside number is [phone] or [phone]  OBS brochure/video discussed/provided to patient:  N/A Dementia  ED Medications: Medications - No data to display  Drips infusing:  No  For the majority of the shift, the patient's behavior Green. Interventions performed were Monitor .     Severe Sepsis OR Septic Shock Diagnosis Present: No      ED Nurse Name/Phone Number: Arron Nash,   6:55 PM    RECEIVING UNIT ED HANDOFF REVIEW    Above ED Nurse Handoff Report was reviewed: Yes  Reviewed by: Purvi Mitchell on October 26, 2017 at 8:32 PM

## 2017-10-26 NOTE — ED PROVIDER NOTES
History     Chief Complaint:  Fall    History limited due to Alzheimer disease and subsequently provided by her .  HPI   Roxanna Castorena is a 72 year old female with history of Alzheimer and Parkinson's who presents to the emergency department today for evaluation of fall. The patient's  reports that the patient two weeks ago with wrist fracture, five days ago with bruising to her face, and again this morning. The patient's  reports that he put her to bed and approximately 30 minutes later he came back inside and found that she fell on her left hip on her way to the bathroom with associated bowel incontinence. She was able to ambulate afterwards but was limping. Therefore, she presents to the emergency department for evaluation. Of note, they had an appointment today to get the patient into a nursing home but was unable to go due to the fall this morning.    Allergies:  Donepezil  Lyrica [Pregabalin]     Medications:    AMOXICILLIN PO  Saccharomyces boulardii (PROBIOTIC) 250 MG CAPS  rivastigmine (EXELON) 9.5 MG/24HR 24 hr patch  propranolol (INDERAL) 10 MG tablet  memantine (NAMENDA) 10 MG tablet  lisinopril (PRINIVIL,ZESTRIL) 10 MG tablet  gabapentin (NEURONTIN) 100 MG capsule  carbidopa-levodopa (SINEMET)  MG per tablet    Past Medical History:    Anemia  Cerebral aneurysm  CKD  Colon polyps  Dementia  Deviated septum/nasal congestion  Dysphagia  Hypertension  Migraine  Monoclonal gammopathy  Nasal tip skin lesion  Osteopenia  Other chronic pain  Parkinsons disease  Synovitis and tenosynovitis, unspecified  Tremor  Trigeminal neuralgia  Unspecified cerebral artery occlusion with cerebral infarction    Past Surgical History:    Bone marrow biopsy  SILVANA/BSO  Appendectomy  UGI endoscopy    Family History:    Hypertension  Cerebrovascular  Migraine  Alzheimer dementia  Skin cancer    Social History:  The patient was accompanied to the ED by her .  Smoking Status: Former  Smokeless  "Tobacco: Never  Alcohol Use: No  Marital Status:       Review of Systems   Unable to perform ROS: Dementia     Physical Exam   First Vitals:  BP: (!) 196/119  Pulse: 69  Temp: 97.4  F (36.3  C)  Resp: 20  Height: 167.6 cm (5' 6\")  Weight: 50.3 kg (111 lb)  SpO2: 97 %    Physical Exam  Constitutional:  Appears well-developed and well-nourished. Alert. Conversant. Non toxic.  HENT:   Head: has bilateral black eyes as well as small amount of bruising and a tiny laceration on the bridge of her nose that it's not an acute injury. No depressed skull fracture, Fong's sign, or hemotympanum.   Nose: Nose normal.  Mouth/Throat: Oral mucosa is clear and moist. no trismus. Pharynx normal. Tonsils symmetric. No tonsillar enlargement, erythema, or exudate.  Eyes: Conjunctivae normal. EOM normal. Pupils equal, round, and reactive to light. No scleral icterus.   Neck: Normal range of motion. Neck supple. No tracheal deviation present.   Cardiovascular: Normal rate, regular rhythm. No gallop. No friction rub. No murmur heard. Symmetric radial artery pulses   Pulmonary/Chest: Effort normal. No stridor. No respiratory distress. No wheezes. No rales. No rhonchi . No tenderness.   Abdominal: Soft. Bowel sounds normal. No distension. No mass. No tenderness. No rebound. No guarding.   Musculoskeletal:   left upper extremity: Normal range of motion. No edema. No tenderness. No deformity   right upper extremity: has a short-term cast from her left hand to her left wrist. No tenderness in that area. Normal range of motion in her elbow and shoulder. No tenderness of the elbow, humorous, shoulder, clavicle.   Pelvis is stable. No spine tenderness.  Right lower extremity: Normal range of motion. No edema. No tenderness. No deformity   left lower extremity: patient's  reports that she's been limping on her left leg and apparently has left hip pain. By my exam I cannot listen any point tenderness over the iliac crest, lateral hip, " anterior hip, or over the pelvis. There is no deformity, rotation, crepitus. No obvious ecchymosis or swelling there. No tenderness over the quadriceps, femur, hamstring, or thigh. No need tenderness or ecchymosis. Normal range of motion and the need to just past 90  flexion. The tenderness over her tibia, fibula, ankle, foot.   Lymph: No cervical adenopathy.   Neurological: Alert but is nonverbal, which is apparently her baseline. Cranial Nerves intact II-XII except I did not formally test gag or visual acuity.  EOMI. Strength 5/5 bilaterally in the trapezius, deltoid, biceps, triceps, , thumb opposition, finger abduction, psoas, quadriceps, hamstring, gastrocnemius, tibialis anterior. Sensation intact to light touch in all 4 extremities (C4-T1 and L4-S1). coordination normal. Mental status normal. Attention normal. GCS 15. Memory at baseline per .  Skin: Skin is warm and dry. No rash noted. No pallor. Normal capillary refill.  Psychiatric:  Limited -dementia      Emergency Department Course     ECG:  Indication: fall  Completed at 1641.  Read at 1720.   Sinus rhythm with 1st degree AV block  Nonspecific T wave abnormality  Abnormal ECG   Rate 74 bpm. PA interval 332. QRS duration 76. QT/QTc 372/412. P-R-T axes 101 43 92.    Imaging:  Radiology findings were communicated with the patient who voiced understanding of the findings.  CT Head w/o Contrast  IMPRESSION:  1. No acute abnormality  2. Generalized atrophy of the brain  Report per radiology    XR Pelvis and Hip Left 2 Views  IMPRESSION:  No evidence of left hip fracture. Right femur is not well  profiled probably due to flexion, and it would be difficult to rule  out fracture in the right femur.  Report per radiology    Laboratory:  CBC: pending at sign out  BMP: pending at sign out  UA with Microscopic: pending at sign out    Emergency Department Course:  Nursing notes and vitals reviewed.  The patient was sent for a XR Pelvis and Hip Left 2 Views  and CT Head w/o contrast while in the emergency department, results above.   IV was inserted and blood was drawn for laboratory testing, results above.  The patient provided a urine sample here in the emergency department. This was sent for laboratory testing, findings above.  1618: I performed an exam of the patient as documented above.   1805: Patient rechecked and patient's  is not present.   1811: I called the patient's  and we spoke about the patient's findings and plan of care.  I spoke with USMAN Zurita of the hospitalist service regarding patient's presentation, findings, and plan of care.  Patient was signed out to Dr. Barroso.    Impression & Plan      Medical Decision Making:  Roxanna Castorena is a 72 year old female brought to the ER today by her  for evaluation after a fall. She said progressing Parkinson's and Alzheimer's disease for the past couple of months suffered 3 recent falls in the past 2 weeks. During one fall she fractured her right wrist, and is currently in a cast for that. She fell again a few days ago injured her head. She fell again today and injured her left hip.    Patient did have significant bruising on her face, consistent with raccoon eyes. Based on this patient's initial presentation, I was concerned about possible intracranial injuries (e.g. skull fracture, epidural hematoma, subdural hematoma, intracerebral hemorrhage, and traumatic subarachnoid hemorrhage).  CT imaging was obtained and fortunately was normal.      I don't actually 3 x-rays for her right wrist injury as the cast seems to be intact and she's having no discomfort there.    She did complain of some left hip pain after the fall. On my exam there is no really focal tenderness or crepitus or deformity. I am able to passively [place] hip to just be a 90  flexion without any apparent grimacing or discomfort. However exam is limited because the patient has dementia. X-rays of the patient's hip and  pelvis are negative for fracture. Her  reports that she has been able to bear weight on that hip since her fall. This may be a hip contusion. The possibility for an occult pelvic fracture cannot be ruled out without CT or MRI, hold off on advanced imaging for now and pursue a course for pain management.    Patient doesn't have any real acute change in a mental status, but has had delirium in the past with UTIs. We are in the process of doing a metabolic workup and checking urinalysis for infection. Nonetheless, it's clear she requires admission for fall precautions. Her  says that he started the process to get her admitted to a memory care in [facility] , but nobody has been arranged yet. We will in their here it rages overnight for fall precautions, consult physical therapy tomorrow and social work to help with placement.    I discussed the patient with the admitting hospitalist service, PA. She will admit the patient regardless of her lab and urinalysis findings. Also discussed the patient with my colleague Dr. Pantoja will follow up on her lab tests. If they're abnormal he will treat appropriately.        Diagnosis:    ICD-10-CM    1. Fall, initial encounter W19.XXXA    2. Hip pain, left M25.552    3. Closed head injury, initial encounter S09.90XA        Disposition:  Admitted to hospitalist service    Scribe Disclosure:  I, Isabella Roman, am serving as a scribe at 4:13 PM on 10/26/2017 to document services personally performed by Laureano Iglesias, based on my observations and the provider's statements to me.   10/26/2017   [facility] EMERGENCY DEPARTMENT       Laureano Iglesias MD  10/26/17 [id number]

## 2017-10-26 NOTE — ED NOTES
Fall this morning, c/o pain in left hip pain. Fell 2 weeks ago and fx right arm, fell Saturday and hit face. Bilateral black eyes, swelling on bridge of nose. Pt has difficulty walking. Hx of parkinsons, and alzheimers. Spouse had an appointment for an evaluation at a skilled nursing facility today but had to cancel d/t the fall.

## 2017-10-26 NOTE — Clinical Note
Admitting Physician: GABRIELLE SUE [[id number]]   [facility][id number][facility]   Bed Type: Observation Unit [54]   Special needs: Fall Risk [8]   Bed request comments: Patient has left hip pain, head injury, Dementia-  Would be good to have a posey alarm or remote monitoring

## 2017-10-27 ENCOUNTER — APPOINTMENT (OUTPATIENT)
Dept: GENERAL RADIOLOGY | Facility: CLINIC | Age: 72
DRG: 057 | End: 2017-10-27
Attending: INTERNAL MEDICINE
Payer: MEDICARE

## 2017-10-27 LAB
ALBUMIN SERPL-MCNC: 2.9 G/DL (ref 3.4–5)
ALP SERPL-CCNC: 73 U/L (ref 40–150)
ALT SERPL W P-5'-P-CCNC: 36 U/L (ref 0–50)
ANION GAP SERPL CALCULATED.3IONS-SCNC: 5 MMOL/L (ref 3–14)
AST SERPL W P-5'-P-CCNC: 23 U/L (ref 0–45)
BASOPHILS # BLD AUTO: 0 10E9/L (ref 0–0.2)
BASOPHILS NFR BLD AUTO: 0.1 %
BILIRUB SERPL-MCNC: 0.9 MG/DL (ref 0.2–1.3)
BUN SERPL-MCNC: 19 MG/DL (ref 7–30)
CALCIUM SERPL-MCNC: 8.5 MG/DL (ref 8.5–10.1)
CHLORIDE SERPL-SCNC: 106 MMOL/L (ref 94–109)
CO2 SERPL-SCNC: 28 MMOL/L (ref 20–32)
CREAT SERPL-MCNC: 1.07 MG/DL (ref 0.52–1.04)
DIFFERENTIAL METHOD BLD: ABNORMAL
EOSINOPHIL # BLD AUTO: 0 10E9/L (ref 0–0.7)
EOSINOPHIL NFR BLD AUTO: 0 %
ERYTHROCYTE [DISTWIDTH] IN BLOOD BY AUTOMATED COUNT: 13.3 % (ref 10–15)
GFR SERPL CREATININE-BSD FRML MDRD: 50 ML/MIN/1.7M2
GLUCOSE SERPL-MCNC: 99 MG/DL (ref 70–99)
HCT VFR BLD AUTO: 29.4 % (ref 35–47)
HGB BLD-MCNC: 9.8 G/DL (ref 11.7–15.7)
IMM GRANULOCYTES # BLD: 0 10E9/L (ref 0–0.4)
IMM GRANULOCYTES NFR BLD: 0.6 %
INTERPRETATION ECG - MUSE: NORMAL
LYMPHOCYTES # BLD AUTO: 0.7 10E9/L (ref 0.8–5.3)
LYMPHOCYTES NFR BLD AUTO: 9.5 %
MCH RBC QN AUTO: 35 PG (ref 26.5–33)
MCHC RBC AUTO-ENTMCNC: 33.3 G/DL (ref 31.5–36.5)
MCV RBC AUTO: 105 FL (ref 78–100)
MONOCYTES # BLD AUTO: 0.3 10E9/L (ref 0–1.3)
MONOCYTES NFR BLD AUTO: 4 %
NEUTROPHILS # BLD AUTO: 6.1 10E9/L (ref 1.6–8.3)
NEUTROPHILS NFR BLD AUTO: 85.8 %
NRBC # BLD AUTO: 0 10*3/UL
NRBC BLD AUTO-RTO: 0 /100
PLATELET # BLD AUTO: 112 10E9/L (ref 150–450)
POTASSIUM SERPL-SCNC: 3.8 MMOL/L (ref 3.4–5.3)
PROT SERPL-MCNC: 6 G/DL (ref 6.8–8.8)
RBC # BLD AUTO: 2.8 10E12/L (ref 3.8–5.2)
SODIUM SERPL-SCNC: 139 MMOL/L (ref 133–144)
WBC # BLD AUTO: 7.1 10E9/L (ref 4–11)

## 2017-10-27 PROCEDURE — 25000128 H RX IP 250 OP 636: Performed by: INTERNAL MEDICINE

## 2017-10-27 PROCEDURE — 25000132 ZZH RX MED GY IP 250 OP 250 PS 637: Mod: GY | Performed by: INTERNAL MEDICINE

## 2017-10-27 PROCEDURE — 85025 COMPLETE CBC W/AUTO DIFF WBC: CPT | Performed by: INTERNAL MEDICINE

## 2017-10-27 PROCEDURE — 99233 SBSQ HOSP IP/OBS HIGH 50: CPT | Performed by: INTERNAL MEDICINE

## 2017-10-27 PROCEDURE — 99222 1ST HOSP IP/OBS MODERATE 55: CPT | Performed by: CLINICAL NURSE SPECIALIST

## 2017-10-27 PROCEDURE — 80053 COMPREHEN METABOLIC PANEL: CPT | Performed by: INTERNAL MEDICINE

## 2017-10-27 PROCEDURE — A9270 NON-COVERED ITEM OR SERVICE: HCPCS | Mod: GY | Performed by: INTERNAL MEDICINE

## 2017-10-27 PROCEDURE — 25000132 ZZH RX MED GY IP 250 OP 250 PS 637: Mod: GY | Performed by: PHYSICIAN ASSISTANT

## 2017-10-27 PROCEDURE — 12000000 ZZH R&B MED SURG/OB

## 2017-10-27 PROCEDURE — 71010 XR CHEST PORT 1 VW: CPT

## 2017-10-27 PROCEDURE — A9270 NON-COVERED ITEM OR SERVICE: HCPCS | Mod: GY | Performed by: PHYSICIAN ASSISTANT

## 2017-10-27 PROCEDURE — G0378 HOSPITAL OBSERVATION PER HR: HCPCS

## 2017-10-27 PROCEDURE — 36415 COLL VENOUS BLD VENIPUNCTURE: CPT | Performed by: INTERNAL MEDICINE

## 2017-10-27 RX ORDER — CARBIDOPA AND LEVODOPA 25; 100 MG/1; MG/1
1 TABLET, EXTENDED RELEASE ORAL 2 TIMES DAILY
Status: DISCONTINUED | OUTPATIENT
Start: 2017-10-27 | End: 2017-10-27

## 2017-10-27 RX ORDER — ACETAMINOPHEN 325 MG/1
650 TABLET ORAL EVERY 4 HOURS PRN
Status: DISCONTINUED | OUTPATIENT
Start: 2017-10-27 | End: 2017-11-01 | Stop reason: HOSPADM

## 2017-10-27 RX ORDER — SODIUM CHLORIDE AND POTASSIUM CHLORIDE 150; 450 MG/100ML; MG/100ML
INJECTION, SOLUTION INTRAVENOUS CONTINUOUS
Status: DISCONTINUED | OUTPATIENT
Start: 2017-10-27 | End: 2017-10-29

## 2017-10-27 RX ORDER — CARBIDOPA AND LEVODOPA 25; 100 MG/1; MG/1
1 TABLET, ORALLY DISINTEGRATING ORAL 3 TIMES DAILY
Status: DISCONTINUED | OUTPATIENT
Start: 2017-10-27 | End: 2017-11-01 | Stop reason: HOSPADM

## 2017-10-27 RX ORDER — ACETAMINOPHEN 650 MG/1
650 SUPPOSITORY RECTAL EVERY 4 HOURS PRN
Status: DISCONTINUED | OUTPATIENT
Start: 2017-10-27 | End: 2017-11-01 | Stop reason: HOSPADM

## 2017-10-27 RX ORDER — HYDRALAZINE HYDROCHLORIDE 20 MG/ML
10 INJECTION INTRAMUSCULAR; INTRAVENOUS EVERY 4 HOURS PRN
Status: DISCONTINUED | OUTPATIENT
Start: 2017-10-27 | End: 2017-11-01 | Stop reason: HOSPADM

## 2017-10-27 RX ADMIN — PROPRANOLOL HYDROCHLORIDE 10 MG: 10 TABLET ORAL at 20:01

## 2017-10-27 RX ADMIN — CARBIDOPA AND LEVODOPA 1 TABLET: 25; 100 TABLET, ORALLY DISINTEGRATING ORAL at 16:32

## 2017-10-27 RX ADMIN — CARBIDOPA AND LEVODOPA 1 TABLET: 25; 100 TABLET, ORALLY DISINTEGRATING ORAL at 10:26

## 2017-10-27 RX ADMIN — CARBIDOPA AND LEVODOPA 1 TABLET: 25; 100 TABLET, ORALLY DISINTEGRATING ORAL at 20:01

## 2017-10-27 RX ADMIN — POTASSIUM CHLORIDE AND SODIUM CHLORIDE: 450; 150 INJECTION, SOLUTION INTRAVENOUS at 12:04

## 2017-10-27 RX ADMIN — ACETAMINOPHEN 650 MG: 650 SUPPOSITORY RECTAL at 03:47

## 2017-10-27 RX ADMIN — POTASSIUM CHLORIDE AND SODIUM CHLORIDE: 450; 150 INJECTION, SOLUTION INTRAVENOUS at 21:47

## 2017-10-27 RX ADMIN — MEMANTINE 10 MG: 5 TABLET ORAL at 20:01

## 2017-10-27 RX ADMIN — RIVASTIGMINE 1 PATCH: 9.5 PATCH, EXTENDED RELEASE TRANSDERMAL at 19:27

## 2017-10-27 ASSESSMENT — ACTIVITIES OF DAILY LIVING (ADL)
ADLS_ACUITY_SCORE: 20
ADLS_ACUITY_SCORE: 20

## 2017-10-27 NOTE — PROGRESS NOTES
Patient transferred to 5-Med/Surg Oncology Unit with VPM & belongings due to a change in status to Inpatient.  Report called and given to BRYAN Bhatia on 5th floor.  Family called and updated: Yes

## 2017-10-27 NOTE — PLAN OF CARE
Problem: Patient Care Overview  Goal: Plan of Care/Patient Progress Review  SLP: Bedside swallow eval orders received. Per discussion with MD, pt currently not appropriate for participation in swallow eval d/t decreased BHAVNA. Previous VFSS completed in 2013 demonstrated no aspiration or penetration, however pt noted to have moderate residuals remaining in valleculae that did not fully clear. Per MD, unclear of pts baseline diet PTA. Will follow up as appropriate.

## 2017-10-27 NOTE — PLAN OF CARE
Problem: Patient Care Overview  Goal: Discharge Needs Assessment  Outcome: No Change  PRIMARY DIAGNOSIS: GENERALIZED WEAKNESS/FALLS     OUTPATIENT/OBSERVATION GOALS TO BE MET BEFORE DISCHARGE  1. Orthostatic performed: N/A, not able to follow commands if gets up      2. Tolerating PO medications: Awaiting meds to do dysphagia screen.  UPDATE:  Failed dysphagia prescreen.  No PO meds given.  Received rectal tylenol for fever and pain.      3. Return to near baseline physical activity: No, PT order in place      4. Cleared for discharge by consultants (if involved): No,  Has SW, PT, OT ordered for 10/27/17      Discharge Planner Nurse   Safe discharge environment identified: Yes  Barriers to discharge: Yes, awaiting SW, PT, OT consult.  Unable to take PO meds, failed dysphagia, febrile.            Please review provider order for any additional goals.   Nurse to notify provider when observation goals have been met and patient is ready for discharge.      Pt not A&Ox4.  Blood pressure elevated after movement and admission to the floor.  VPM in place.  On enteric precautions to r/o Cdiff.  Restless in bed.  Incontinent of urine and smearing stool on arrival to floor.  Right arm in cast.  Facial and left hip bruising noted.  Left hand PIV is wrapped.  Ristigmine patch on back on arrival to floor.  Not OOB this shift.  Plan:  VPM monitoring, SW, PT, OT consult.  Will be d/c to LTC facility when ready.  Will continue to monitor.       10/27/17 1:04 AM  Medications arrived to floor.  Assessed for dysphagia, pt not appropriate to participate in dysphagia screen so failed prescreen.  Not alert, not able to follow commands, garbled speech.  Meds not given, placed NPO.      10/27/17 4:47 AM Pt remains confused.  Not A&Ox4.  VPM.  Elevated blood pressures overnight, febrile t-max 100.7 axillary.  Hospitalist notified, received order for PRN IV hydralazine 10mg and rectal tylenol.  Vitals reassessed, parameters not med for PRN  hydralazine.  Did receive rectal tylenol.  Mouthcare provided.  Incontinent of urine.  Not OOB this shift.  Plan:  Swallow eval, SW, PT, OT, VPM, monitor temp and blood pressures.  Will be d/c to LTC facility 10/27/17 if stable.  Will continue to monitor.         Entered by: Purvi Mitchell 10/27/2017 4:44 AM     Please review provider order for any additional goals.   Nurse to notify provider when observation goals have been met and patient is ready for discharge.

## 2017-10-27 NOTE — PROVIDER NOTIFICATION
Hospitalist notified. Patient was febrile 100.7 axillary.  Blood pressures elevated at 181/61 and 191/78 on right leg.  Failed dysphagia pre-screen, unable to take PO.  Requested rectal tylenol and PRN IV blood pressure medication.  Received order for PRN rectal tylenol and PRN IV hydralzine.  Rectal tylenol administered for temp 100.1.  IV hydralzine not given as BP was 166/77 when rechecked.  Will continue to monitor this patient.

## 2017-10-27 NOTE — PROGRESS NOTES
PCXR reviewed - shows no infiltrate suspicious for pneumonia.  On labs - no leukocytosis; creat slightly elevated on admission.  IVF started.

## 2017-10-27 NOTE — CONSULTS
[facility]    Palliative Care Consultation   Text Page    Date of Admission:  10/26/2017    Assessment & Plan   Roxanna Castorena is a 72 year old female who was admitted on 10/26/2017. I was asked by Hospitalist Shakila Oconnell DO  to see the patient for advancing dementia.    Recommendations:  1. Dementia - patient is not verbal and does not demonstrate medical capacity. Her /healthcare agent Chon Castorena is assisting in care plan.    2. Weakness -  is interested in physical therapy input as he his hopeful that she will regain strength before Monday's dismissal to Memory Care at [facility] in [facility] as he is transporting her to [facility] .    3. Dysphagia -  states interest in options safe swallowing strategies. He acknowledged that a feeding tube would not be a good option.     4. Itching - reasonable to try topical Sarna for comfort.    5. Goals of Care: DNR/DNI - Restorative care.  I will plan to follow up with her  on Monday to complete a POLST for dismissal     Disease Process/es & Symptoms:  Roxanna Castorena is a 72 year old patient admitted following 3 mechanical falls in the past 2 weeks. She has a history of dementia, parkinson's disease, CKD stage 3, and HTN.      Psychosocial/Spiritual Needs:   oriented to Spiritual Health and Social Work Services as part of Palliative Care team.    Decision-Making & Goals of Care:  Discussion/counseling today about goals of care/decisions:   Met patient as she was resting in bed    Patient has decision-making capacity Unreliable - patient only verbalization is mumbling per   Patient has a Health Care Agent named in a legal Advance Directive document dated 12.12.13. See name(s) and contact information in Health Care Agent/Legal Guardian section below.  Name: Chon Castorena, Relationship: Spouse, Phone(s): [phone].  First Alternate Name: Sonia Trey, Relationship: Niece, Phone(s):  [phone].    Patient has a completed health care directive available in the chart (Y/N): YES  Physician orders for life-sustaining treatment (POLST) form is not completed  Code Status: Do not resuscitate / Do not intubate     Findings & plan of care discussed with: Hospitalist Shakila Oconnell MD and bedside nurse Chantell Taylor RN  Follow-up plan from palliative team: Will plan to follow up on Monday morning to complete a POLST with her  before dismissal.   Thank you for involving us in the patient's care.     Itzel Marquez MS, RN, CNS, APRN, ACHPN, FAACVPR  Pain and Palliative Care  Pager [phone]  Office [phone]     Time Spent on this Encounter   I spent  40 minutes in assessment of the patient and discussion with the family. Another 25 minutes in review of chart, documentation and discussion with the health care team.    Reason for Consult   Reason for consult: I was asked by Hospitalist Shakila Oconnell DO to see the patient for advancing dementia.    Primary Care Physician   Lui Jeffrey    Chief Complaint   Fall    History is obtained from the electronic health record and patient's spouse    Past Medical History   I have reviewed this patient's medical history and updated it with pertinent information if needed.   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 Gets nausea with 10mg tab     DEVIATED SEPTUM/NASAL CONGESTION      Dysphagia 10/31/2013     HTN (hypertension) 7/5/2012     LEFT BREAST TENDERNESS 7/02    Negative U/S     MIGRAINE HEADACHE 5/98     Monoclonal gammopathy      NASAL TIP SKIN LESION 5/03    Actinic keratosis     Osteopenia 10/31/2013     Other chronic pain      Parkinsons  disease ([facility])      Synovitis and tenosynovitis, unspecified 4/03    Bilateral thumbs, s/p cortisone injections     Tremor 10/31/2013     Trigeminal neuralgia 8/00    Negative MRI Head     Unspecified cerebral artery occlusion with cerebral infarction     hx tia's yrs ago       Past Surgical History   I have reviewed this patient's surgical history and updated it with pertinent information if needed.  Past Surgical History:   Procedure Laterality Date     BONE MARROW BIOPSY, BONE SPECIMEN, NEEDLE/TROCAR Right 10/9/2014    Procedure: BIOPSY BONE MARROW;  Surgeon: Lokesh Malik MD;  Location: [facility] OR     C NONSPECIFIC PROCEDURE  1979    SILVANA/BSO     C NONSPECIFIC PROCEDURE  1964    cholecystectomy     C NONSPECIFIC PROCEDURE  1951    appendectomy     HC UGI ENDOSCOPY, SIMPLE EXAM  09/26/13    [facility]       Prior to Admission Medications   Prior to Admission Medications   Prescriptions Last Dose Informant Patient Reported? Taking?   carbidopa-levodopa (SINEMET)  MG per tablet 10/26/2017 at am  Yes Yes   Sig: Take 1 tablet by mouth 3 times daily At 0700, 1300, and 1800   gabapentin (NEURONTIN) 100 MG capsule   No No   Sig: Take 1 capsule (100 mg) by mouth 3 times daily As needed for  Facial nerve pain   lisinopril (PRINIVIL,ZESTRIL) 10 MG tablet 10/26/2017  No Yes   Sig: Take 1 tablet (10 mg) by mouth daily   memantine (NAMENDA) 10 MG tablet 10/26/2017 at am  No Yes   Sig: Take 1 tablet (10 mg) by mouth 2 times daily   propranolol (INDERAL) 10 MG tablet 10/26/2017 at am  No Yes   Sig: Take 1 tablet (10 mg) by mouth 2 times daily   rivastigmine (EXELON) 9.5 MG/24HR 24 hr patch 10/26/2017  Yes Yes   Sig: Place 1 patch onto the skin daily      Facility-Administered Medications: None     Allergies   Allergies   Allergen Reactions     Donepezil      Hot flashes     Lyrica [Pregabalin] Hives       Social History   Living situation: Lived with   Family system:   is supportive.   Functional  status; needs help with ADLs  History of substance use/abuse:  reports that she quit smoking about 44 years ago. She has never used smokeless tobacco.  reports that she does not drink alcohol.  Caodaism affiliation: Taoism    Family History   I have reviewed this patient's family history and updated it with pertinent information if needed.   Family History   Problem Relation Age of Onset     Hypertension Father       84     CEREBROVASCULAR DISEASE Father      Neurologic Disorder Mother      migraine HA, Alzheimer's dementia     CANCER Sister      skin CA     CANCER Brother      skin CA     CANCER Other      sister's son, skin CA       Review of Systems   Review of systems not obtained due to patient factors - confusion and mental status    Physical Exam   Temp:  [97.1  F (36.2  C)-100.7  F (38.2  C)] 98.1  F (36.7  C)  Pulse:  [] 103  Resp:  [16-18] 16  BP: ()/() 156/85  SpO2:  [93 %-97 %] 94 %  110 lbs 11.2 oz  GEN:  Alert, intermittently mumbles, but does not respond to simple one step commands, appears comfortable, NAD.  HEENT:  Normocephalic bruising on both cheeks with small scab on bridge of nose, no scleral icterus, no nasal discharge, mouth moist.  CV:  RRR, S1, S2; no murmurs or other irregularities noted.  +3 DP/PT pulses bilaterally; no edema BLE.  RESP:  Clear to auscultation bilaterally without rales/rhonchi/wheezing/retractions.  Symmetric chest rise on inhalation noted.  Normal respiratory effort.  ABD:  Rounded, soft, non-tender/non-distended.  +BS  EXT: Moves all 4 extremities.     M/S:   Does not grimace with palpation of extremities.    SKIN:  Warm and dry to touch, toes are cool to the touch.    NEURO:Response to touch of all extremities.    Psych:  Flat affect, calm, no coherent verbalizations.    Data   Results for orders placed or performed during the hospital encounter of 10/26/17   CT Head w/o Contrast    Narrative    CT SCAN OF THE HEAD WITHOUT CONTRAST  10/26/2017   5:18 PM     HISTORY: Fall, head trauma. Alzheimer disease and Parkinson's.    TECHNIQUE: Axial images of the head and coronal reformations without  IV contrast material. Radiation dose for this scan was reduced using  automated exposure control, adjustment of the mA and/or kV according  to patient size, or iterative reconstruction technique.    COMPARISON: 7/28/2017    FINDINGS: There is generalized atrophy of the brain. There is no  evidence of intracranial hemorrhage, mass, acute infarct or anomaly.     The visualized portions of the sinuses and mastoids appear normal.  There is no evidence of trauma.       Impression    IMPRESSION:   1. No acute abnormality.  2. Generalized atrophy of the brain.    STANTON BURGOS MD   XR Pelvis and Hip Left 2 Views    Narrative    PELVIS WITH LEFT HIP TWO VIEWS  10/26/2017 5:14 PM     HISTORY: Fall, left hip pain.    COMPARISON: None.      Impression    IMPRESSION:  No evidence of left hip fracture. Right femur is not well  profiled probably due to flexion, and it would be difficult to rule  out fracture in the right femur.    STANTON BURGOS MD   XR Chest Port 1 View    Narrative    XR CHEST PORT 1 VW 10/27/2017 10:45 AM    HISTORY: Fever. Assess for pneumonia.    COMPARISON: None.      Impression    IMPRESSION: A single view of the chest shows no acute or active  cardiopulmonary disease.     LIANE PALUMBO MD   CBC with platelets differential   Result Value Ref Range    WBC 10.7 4.0 - 11.0 10e9/L    RBC Count 3.23 (L) 3.8 - 5.2 10e12/L    Hemoglobin 11.2 (L) 11.7 - 15.7 g/dL    Hematocrit 33.0 (L) 35.0 - 47.0 %     (H) 78 - 100 fl    MCH 34.7 (H) 26.5 - 33.0 pg    MCHC 33.9 31.5 - 36.5 g/dL    RDW 12.8 10.0 - 15.0 %    Platelet Count 142 (L) 150 - 450 10e9/L    Diff Method Automated Method     % Neutrophils 90.7 %    % Lymphocytes 4.0 %    % Monocytes 4.7 %    % Eosinophils 0.0 %    % Basophils 0.1 %    % Immature Granulocytes 0.5 %    Nucleated RBCs 0 0 /100    Absolute  Neutrophil 9.7 (H) 1.6 - 8.3 10e9/L    Absolute Lymphocytes 0.4 (L) 0.8 - 5.3 10e9/L    Absolute Monocytes 0.5 0.0 - 1.3 10e9/L    Absolute Eosinophils 0.0 0.0 - 0.7 10e9/L    Absolute Basophils 0.0 0.0 - 0.2 10e9/L    Abs Immature Granulocytes 0.1 0 - 0.4 10e9/L    Absolute Nucleated RBC 0.0    Basic metabolic panel   Result Value Ref Range    Sodium 138 133 - 144 mmol/L    Potassium 3.9 3.4 - 5.3 mmol/L    Chloride 105 94 - 109 mmol/L    Carbon Dioxide 29 20 - 32 mmol/L    Anion Gap 4 3 - 14 mmol/L    Glucose 121 (H) 70 - 99 mg/dL    Urea Nitrogen 24 7 - 30 mg/dL    Creatinine 0.82 0.52 - 1.04 mg/dL    GFR Estimate 69 >60 mL/min/1.7m2    GFR Estimate If Black 83 >60 mL/min/1.7m2    Calcium 8.7 8.5 - 10.1 mg/dL   UA with Microscopic   Result Value Ref Range    Color Urine Yellow     Appearance Urine Slightly Cloudy     Glucose Urine 150 (A) NEG^Negative mg/dL    Bilirubin Urine Negative NEG^Negative    Ketones Urine 5 (A) NEG^Negative mg/dL    Specific Gravity Urine 1.016 1.003 - 1.035    Blood Urine Negative NEG^Negative    pH Urine 7.0 5.0 - 7.0 pH    Protein Albumin Urine Negative NEG^Negative mg/dL    Urobilinogen mg/dL 0.0 0.0 - 2.0 mg/dL    Nitrite Urine Negative NEG^Negative    Leukocyte Esterase Urine Negative NEG^Negative    Source Catheterized Urine     WBC Urine 1 0 - 2 /HPF    RBC Urine 2 0 - 2 /HPF    Squamous Epithelial /HPF Urine 2 (H) 0 - 1 /HPF    Mucous Urine Present (A) NEG^Negative /LPF   CBC with platelets differential   Result Value Ref Range    WBC 7.1 4.0 - 11.0 10e9/L    RBC Count 2.80 (L) 3.8 - 5.2 10e12/L    Hemoglobin 9.8 (L) 11.7 - 15.7 g/dL    Hematocrit 29.4 (L) 35.0 - 47.0 %     (H) 78 - 100 fl    MCH 35.0 (H) 26.5 - 33.0 pg    MCHC 33.3 31.5 - 36.5 g/dL    RDW 13.3 10.0 - 15.0 %    Platelet Count 112 (L) 150 - 450 10e9/L    Diff Method Automated Method     % Neutrophils 85.8 %    % Lymphocytes 9.5 %    % Monocytes 4.0 %    % Eosinophils 0.0 %    % Basophils 0.1 %    %  Immature Granulocytes 0.6 %    Nucleated RBCs 0 0 /100    Absolute Neutrophil 6.1 1.6 - 8.3 10e9/L    Absolute Lymphocytes 0.7 (L) 0.8 - 5.3 10e9/L    Absolute Monocytes 0.3 0.0 - 1.3 10e9/L    Absolute Eosinophils 0.0 0.0 - 0.7 10e9/L    Absolute Basophils 0.0 0.0 - 0.2 10e9/L    Abs Immature Granulocytes 0.0 0 - 0.4 10e9/L    Absolute Nucleated RBC 0.0    Comprehensive metabolic panel   Result Value Ref Range    Sodium 139 133 - 144 mmol/L    Potassium 3.8 3.4 - 5.3 mmol/L    Chloride 106 94 - 109 mmol/L    Carbon Dioxide 28 20 - 32 mmol/L    Anion Gap 5 3 - 14 mmol/L    Glucose 99 70 - 99 mg/dL    Urea Nitrogen 19 7 - 30 mg/dL    Creatinine 1.07 (H) 0.52 - 1.04 mg/dL    GFR Estimate 50 (L) >60 mL/min/1.7m2    GFR Estimate If Black 61 >60 mL/min/1.7m2    Calcium 8.5 8.5 - 10.1 mg/dL    Bilirubin Total 0.9 0.2 - 1.3 mg/dL    Albumin 2.9 (L) 3.4 - 5.0 g/dL    Protein Total 6.0 (L) 6.8 - 8.8 g/dL    Alkaline Phosphatase 73 40 - 150 U/L    ALT 36 0 - 50 U/L    AST 23 0 - 45 U/L   EKG 12-lead, tracing only   Result Value Ref Range    Interpretation ECG Click View Image link to view waveform and result    Social Work IP Consult    Narrative    Itzel Ramachandran     10/27/2017  3:46 PM  Care Transition Initial Assessment -      Met with:  Chon  Active Problems:    Fall    Falls         DATA  Lives With: spouse  Living Arrangements: house  Description of Support System: Supportive, Involved  Who is your support system?:   Support Assessment: Adequate family and caregiver support,   Adequate social supports, Lacks adequate physical care.    QUality Of Family Relationships: supportive, helpful, involved         ASSESSMENT  Cognitive Status:  Pt is profoundly demented  Concerns to be addressed:  has been caring for pt at home   but had made arrangements with pt to be admitted to [facility].  Pt flipped to inpt to work-up decline in   last week.  She is tentavely scheduled to go to  on  Monday.   PLAN  Financial costs for the patient includes  Costs of Memory Care.  Patient given options and choices for discharge Yes .  Patient/family is agreeable to the plan?  is  Patient Goals and Preferences: Memory Care .  Patient anticipates discharging to: Memory Care .

## 2017-10-27 NOTE — PLAN OF CARE
Problem: Patient Care Overview  Goal: Plan of Care/Patient Progress Review  PRIMARY DIAGNOSIS: GENERALIZED WEAKNESS     OUTPATIENT/OBSERVATION GOALS TO BE MET BEFORE DISCHARGE  1. Orthostatic performed: N/A     2. Tolerating PO medications: Yes     3. Return to near baseline physical activity: No     4. Cleared for discharge by consultants (if involved): No     Pt speech garbled and unclear, at times able to make out words. Pt VSS, temp WNL this AM. VPM in room for safety. NPO pending palliative care consult. SLP to re-eval tomorrow AM. D/c disposition identified per Chon, though pt inpatient status and will likely stay the weekend d/t the expected d/c facility not taking admissions on the weekend.  IVF infusing. Will continue to monitor.     Discharge Planner Nurse   Safe discharge environment identified: Yes  Barriers to discharge: Yes       Entered by: Aviva Monae 10/27/2017 9:58 AM     Please review provider order for any additional goals.   Nurse to notify provider when observation goals have been met and patient is ready for discharge.

## 2017-10-27 NOTE — CONSULTS
Care Transition Initial Assessment -      Met with:  Chon  Active Problems:    Fall    Falls         DATA  Lives With: spouse  Living Arrangements: house  Description of Support System: Supportive, Involved  Who is your support system?:   Support Assessment: Adequate family and caregiver support, Adequate social supports, Lacks adequate physical care.    QUality Of Family Relationships: supportive, helpful, involved         ASSESSMENT  Cognitive Status:  Pt is profoundly demented  Concerns to be addressed:  has been caring for pt at home but had made arrangements with pt to be admitted to [facility] in [facility].  Pt flipped to inpt to work-up decline in last week.  She is tentavely scheduled to go to  on Monday.   PLAN  Financial costs for the patient includes  Costs of Memory Care.  Patient given options and choices for discharge Yes .  Patient/family is agreeable to the plan?  is  Patient Goals and Preferences: Memory Care .  Patient anticipates discharging to: Memory Care .

## 2017-10-27 NOTE — PHARMACY-ADMISSION MEDICATION HISTORY
Admission medication history interview status for this patient is complete. See [facility] admission navigator for allergy information, prior to admission medications and immunization status.     Medication history interview source(s):Family - , Chon, [phone]  Medication history resources (including written lists, pill bottles, clinic record):None    Changes made to PTA medication list:  Added: none  Deleted: amoxicillin, probiotic  Changed: none  For patients on insulin therapy: N/A    Actions taken by pharmacist (provider contacted, etc): called [facility] pharmacy and verified doses.      Additional medication history information:None    Medication reconciliation/reorder completed by provider prior to medication history? Yes    Prior to Admission medications    Medication Sig Last Dose Taking? Auth Provider   rivastigmine (EXELON) 9.5 MG/24HR 24 hr patch Place 1 patch onto the skin daily 10/26/2017 Yes Unknown, Entered By History   propranolol (INDERAL) 10 MG tablet Take 1 tablet (10 mg) by mouth 2 times daily 10/26/2017 at am Yes Lui Jeffrey MD   memantine (NAMENDA) 10 MG tablet Take 1 tablet (10 mg) by mouth 2 times daily 10/26/2017 at am Yes Lui Jeffrey MD   lisinopril (PRINIVIL,ZESTRIL) 10 MG tablet Take 1 tablet (10 mg) by mouth daily 10/26/2017 Yes Lui Jeffrey MD   carbidopa-levodopa (SINEMET)  MG per tablet Take 1 tablet by mouth 3 times daily 10/26/2017 at am Yes Reported, Patient   gabapentin (NEURONTIN) 100 MG capsule Take 1 capsule (100 mg) by mouth 3 times daily As needed for  Facial nerve pain   Lui Jeffrey MD

## 2017-10-27 NOTE — PROGRESS NOTES
[facility]  Hospitalist Observation Unit Progress Note  Name: Roxanna Castorena    MRN: [id number]  Provider:  Shakila Oconnell DO    Initial presenting complaint/issue to hospital (Diagnosis): fall and progressive dementia         Assessment and Plan:      Summary of Stay: Roxanna Castorena is a 72 year old female admitted on 10/26/2017 with from home (in [place] with her ) for recurrent mechanical falls and overall functional decline in the last 2 wks    Problem List:   1.  Mechanical falls, recurrent  She has been living with  and has had many falls at home---more so in the last few weeks  Currently she has a right lower arm cast on for fracture (unsure details) and facial bruising  Home safety is a concern    2.  Advancing dementia and parkinson's disease  I am working with pharmacy to get her ODT of sinemet (has missed doses this am and last night)  exelon patch in place  Currently not safe to swallow - so will hold her aricept, for now  CT of the head without any acute abnormality---just atrophy.   reports that she is minimally verbal at baseline - occasionally mumbles.    3.  Swallowing difficulty  Not awake or appropriate enough to participate in swallow eval at this time  Discussed at length with , Chon, on the phone  He has noticed a significant decline in her in the last 2 wks---he has had to place her medications in her mouth and give her a sip of water.  Occasionally she chews her pills, as well.    NPO, for now.  Will start maintenance IVF as she appears quite clinically dry  D/W speech therapist, as well.  Ordering CXR to r/o PNA as is currently at high risk for aspiration    4.  Fever  Noted last night. Temp improved this am.  UA unremarkable.  Loose stools reported yesterday by  ( she slipped on her stools).  No further stools - but c diff and enteric panel pending.  PCXR stat to r/o PNA.    No leukocytosis last night - repeat am labs  "pending    5.  HTN  Unable to take her po lisinopril safely  Will have prn hydralazine available, if needed  SBP has been in the 130's this am    Discussed at length with , Chon, on the phone and Itzel CANTU.  Pt is not stable enough to be d/c to proposed memory care in [facility].  She was evaluated for this bed 2 months ago and has had significant decline in the last 2 wks. Also d/w Palliative care servive---they will see her in consultation, as well.l    DVT Prophylaxis:  -  PCD's in place   Code Status: DNR / DNI  Discharge Dispo: [facility]CU vs. [facility]  Estimated Disch Date / # of Days until Discharge: > 2 additional midnights    Observation unit physician is available until 1400.  After 1400, please contact the observation unit Physician Assistant if questions/concerns.        Interval History:      Minimal response to touch---withdraws to pain and resists touch.  Mumbling some.  Fevers overnight - not enough stools to get a culture yet.  Nursing states that she failed the \"bedside swallow\" last night ---no meds have been given.                  Physical Exam:      Last Vital Signs:  Temp: 98.5  F (36.9  C) Temp src: Oral BP: 132/60 Pulse: 81   Resp: 16 SpO2: 96 % O2 Device: None (Room air)         GEN:  Alert, oriented x 3, comfortable, NAD.  HEENT:  Normocephalic/atraumatic, PERRL, no scleral icterus, no nasal discharge, mouth moist, no oral ulcers or thrush noted.  NECK:  No clear thyromegaly of clear JVD  CV:  Regular rate and rhythm, no murmur to ausc.  S1 + S2 noted, no S3 or S4.  LUNGS:  Clear to auscultation bilaterally.  No rales/rhonchi/wheezing auscultated bilaterally.  No costal retractions bilaterally.  Symmetric chest rise on inhalation noted.  ABD:  Active bowel sounds, soft, non-tender/non-distended.  No rebound/guarding/rigidity.  No masses palpated.  No obvious HSM to exam.  EXT:  No edema or cyanosis bilaterally. No joint synovitis noted.  No calf-tenderness or asymmetry noted.  SKIN:  Dry to " touch, no rashes or jaundice noted.  PSYCH:  Mood appropriate, Not tearful or depressed.  Maintains direct eye contact.  NEURO:  No tremors at rest       Medications:      All current medications were reviewed.         Data:      All new lab and imaging data was reviewed.   Labs:  No results for input(s): CULT in the last 168 hours.    Recent Labs  Lab 10/26/17  1757      POTASSIUM 3.9   CHLORIDE 105   CO2 29   ANIONGAP 4   *   BUN 24   CR 0.82   GFRESTIMATED 69   GFRESTBLACK 83   PRINCESS 8.7       Recent Labs  Lab 10/26/17  1757   WBC 10.7   HGB 11.2*   HCT 33.0*   *   *       Recent Labs  Lab 10/26/17  1757      POTASSIUM 3.9   CHLORIDE 105   CO2 29   ANIONGAP 4   *   BUN 24   CR 0.82   GFRESTIMATED 69   GFRESTBLACK 83   PRINCESS 8.7     No results for input(s): TSH in the last 168 hours.    Recent Labs  Lab 10/26/17  1910   COLOR Yellow   APPEARANCE Slightly Cloudy   URINEGLC 150*   URINEBILI Negative   URINEKETONE 5*   SG 1.016   UBLD Negative   URINEPH 7.0   PROTEIN Negative   NITRITE Negative   LEUKEST Negative   RBCU 2   WBCU 1   Repeat labs from this am - pending   CXR - pending  Recent Imaging:   Recent Results (from the past 24 hour(s))   XR Pelvis and Hip Left 2 Views    Narrative    PELVIS WITH LEFT HIP TWO VIEWS  10/26/2017 5:14 PM     HISTORY: Fall, left hip pain.    COMPARISON: None.      Impression    IMPRESSION:  No evidence of left hip fracture. Right femur is not well  profiled probably due to flexion, and it would be difficult to rule  out fracture in the right femur.    STANTON BURGOS MD   CT Head w/o Contrast    Narrative    CT SCAN OF THE HEAD WITHOUT CONTRAST  10/26/2017  5:18 PM     HISTORY: Fall, head trauma. Alzheimer disease and Parkinson's.    TECHNIQUE: Axial images of the head and coronal reformations without  IV contrast material. Radiation dose for this scan was reduced using  automated exposure control, adjustment of the mA and/or kV according  to  patient size, or iterative reconstruction technique.    COMPARISON: 7/28/2017    FINDINGS: There is generalized atrophy of the brain. There is no  evidence of intracranial hemorrhage, mass, acute infarct or anomaly.     The visualized portions of the sinuses and mastoids appear normal.  There is no evidence of trauma.       Impression    IMPRESSION:   1. No acute abnormality.  2. Generalized atrophy of the brain.    STANTON BURGOS MD

## 2017-10-27 NOTE — H&P
"[facility]    History and Physical  Hospitalist       Date of Admission:  10/26/2017       Assessment & Plan   Roxanna Castorena is a 72 year old female with past medical history of dementia, parkinson's disease, CKD stage 3, and HTN who presented to the ER for evaluation of her third mechanical fall in 3 weeks.  She has suffered a previous right wrist fracture.  During this evaluation CT of the head and images of the pelvis/hip were unremarkable.    Summary:    -Frequent Falls, Mechanical: Patient has fallen 3 times in the past 2 weeks. She has suffered significant facial bruising, wrist fracture (cast in place) and today a hip contusion.  Imaging of the head and hips were unremarkable today.    -SW, PT and OT consult   -Laboratory data unremarkable   -UA unremarkable   -Pelvis/Hip X-ray without acute fracture   -Head CT with an acute intracranial process   -Will hold on orthopedic consult as there does not appear to be any acute hip fracture given negative imaging and essentially normal exam that appears consistent with a hip contusion   -Fall precautions   -Will attempt video monitoring but may need to consider a sitter    -Hypertension: Continue PTA Lisinopril  -Recent UTI now with ongoing diarrhea: Check C. Diff if she has diarrhea,  states diarrhea improved but then recurred today, unclear if due to probiotic  -Parkinson's disease, Alzheimer's disease, Tremor: Continue PTA Namenda, Sinemet, and exelon patch Unclear if inderal is for tremor or hypertension, but tremor is presumed. Will continue gabapentin        DVT Prophylaxis: Pneumatic Compression Devices  Code Status: DNR / DNI (confirmed with  over the phone)    Disposition: Expected discharge once a safe discharge destination is found.  would like for her to go to [facility].  He has spoken with them this evening. He is ok with the 's communicating with \"them\" tomorrow.  The  was told they have " a bed for her and could be ready early in the morning.    Yudith Sanchez PA-C  Pager [phone]        Primary Care Physician   Lui Jeffrey    Chief Complaint   Falls, mechanical    History is obtained from the patient (limited), ER physician, RN, , and chart review    History of Present Illness      History limited due to underlying memory loss:    Roxanna Castorena is a 72 year old female with past medical history of Alzheimer's disease, Parkinson's disease, HTN and CKD who presented to the emergency room for evaluation of a mechanical fall. Today after having an episode of diarrhea, she fell while getting up from the stool.  In the past two weeks she has had two other falls resulting in a fracture wrist and significant facial bruising. This time the patient was placed in bed and thirty minutes later was found on the floor.  The notes indicate she was able to ambulate with a limp.  On examination she has bruising.  There is pain with palpation over the joint and she has full range of motion with no grimacing.    The patient is begin evaluated for memory care placement in [facility] ([facility] An appointment ws scheduled for today but they were unable to go given the fall.  The  is her primary care giver.    She was treated for  UTI approximately one month ago.  Her UA on 10/11 and again today do not show any evidence of a UTI. She is hemodynamically stable and her laboratory data is relatively unchanged from baseline.  CT scan of the head did not reveal an acute intracranial process. Pelvis/Hip x-ray did not reveal an acute fracture.    Past Medical History    I have reviewed this patient's medical history and updated it with pertinent information if needed.   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 Gets nausea with 10mg tab     DEVIATED SEPTUM/NASAL CONGESTION      Dysphagia 10/31/2013     HTN (hypertension) 7/5/2012     LEFT BREAST TENDERNESS 7/02    Negative U/S     MIGRAINE HEADACHE 5/98     Monoclonal gammopathy      NASAL TIP SKIN LESION 5/03    Actinic keratosis     Osteopenia 10/31/2013     Other chronic pain      Parkinsons disease (H)      Synovitis and tenosynovitis, unspecified 4/03    Bilateral thumbs, s/p cortisone injections     Tremor 10/31/2013     Trigeminal neuralgia 8/00    Negative MRI Head     Unspecified cerebral artery occlusion with cerebral infarction     hx tia's yrs ago       Past Surgical History   I have reviewed this patient's surgical history and updated it with pertinent information if needed.  Past Surgical History:   Procedure Laterality Date     BONE MARROW BIOPSY, BONE SPECIMEN, NEEDLE/TROCAR Right 10/9/2014    Procedure: BIOPSY BONE MARROW;  Surgeon: Lokesh Malik MD;  Location: [facility] OR     C NONSPECIFIC PROCEDURE  1979    SILVANA/BSO     C NONSPECIFIC PROCEDURE  1964    cholecystectomy     C NONSPECIFIC PROCEDURE  1951    appendectomy     HC UGI ENDOSCOPY, SIMPLE EXAM  09/26/13    [facility]       Prior to Admission Medications   Prior to Admission Medications   Prescriptions Last Dose Informant Patient Reported? Taking?   AMOXICILLIN PO   Yes No   Saccharomyces boulardii (PROBIOTIC) 250 MG CAPS   Yes No   carbidopa-levodopa (SINEMET)  MG per tablet   Yes No   Sig: Take 1 tablet by mouth 3 times daily   gabapentin (NEURONTIN) 100 MG capsule   No No   Sig: Take 1 capsule (100 mg) by mouth 3 times daily As needed for  Facial nerve pain   lisinopril (PRINIVIL,ZESTRIL) 10 MG tablet   No No   Sig: Take 1 tablet (10 mg) by mouth daily   memantine (NAMENDA) 10 MG tablet   No No   Sig: Take 1 tablet (10 mg) by mouth 2 times daily   propranolol (INDERAL) 10 MG  tablet   No No   Sig: Take 1 tablet (10 mg) by mouth 2 times daily   rivastigmine (EXELON) 9.5 MG/24HR 24 hr patch   Yes No   Sig: Place 1 patch onto the skin daily      Facility-Administered Medications: None     Allergies   Allergies   Allergen Reactions     Donepezil      Hot flashes     Lyrica [Pregabalin] Hives       Social History   I have reviewed this patient's social history and updated it with pertinent information if needed. Roxanna Castorena  reports that she quit smoking about 44 years ago. She has never used smokeless tobacco. She reports that she does not drink alcohol or use illicit drugs.    Family History   I have reviewed this patient's family history and updated it with pertinent information if needed.   Family History   Problem Relation Age of Onset     Hypertension Father       84     CEREBROVASCULAR DISEASE Father      Neurologic Disorder Mother      migraine HA, Alzheimer's dementia     CANCER Sister      skin CA     CANCER Brother      skin CA     CANCER Other      sister's son, skin CA       Review of Systems   ROS limited given underlying severe dementia.    Physical Exam   Temp: 97.4  F (36.3  C) Temp src: Temporal BP: (!) 184/113 Pulse: 69   Resp: 20 SpO2: 97 %      Vital Signs with Ranges  Temp:  [97.4  F (36.3  C)] 97.4  F (36.3  C)  Pulse:  [69] 69  Resp:  [20] 20  BP: (184-201)/() 184/113  SpO2:  [95 %-97 %] 97 %  111 lbs 0 oz    Constitutional: Alert, no acute distress  Eyes: PERRL, EOMs intact  HEENT: patent nares, supple neck, significant facial bruising noted  Respiratory: clear to auscultation  Cardiovascular: regular rate and rhythm, no murmurs or rubs, no edema, DP +2  GI: soft, non tender, non distended, bowel sounds present  Lymph/Hematologic: bruising noted on the face and left hip  Skin: warm and dry other than toes that were cool to the touch, no rashes  Musculoskeletal: able to move all extremities, normal ROM in bilateral hips. MS exam limited given inability  to follow commands, no external rotation noted of the lower extremities  Neurologic: no focal neurologic deficits but again was limited  Psychiatric: unable to appropriately answer questions    Data   Data reviewed today:  I personally reviewed no images or EKG's today.    Recent Labs  Lab 10/26/17  1757   WBC 10.7   HGB 11.2*   *   *      POTASSIUM 3.9   CHLORIDE 105   CO2 29   BUN 24   CR 0.82   ANIONGAP 4   PRINCESS 8.7   *       Imaging:  Recent Results (from the past 24 hour(s))   XR Pelvis and Hip Left 2 Views    Narrative    PELVIS WITH LEFT HIP TWO VIEWS  10/26/2017 5:14 PM     HISTORY: Fall, left hip pain.    COMPARISON: None.      Impression    IMPRESSION:  No evidence of left hip fracture. Right femur is not well  profiled probably due to flexion, and it would be difficult to rule  out fracture in the right femur.    STANTON BURGOS MD   CT Head w/o Contrast    Narrative    CT SCAN OF THE HEAD WITHOUT CONTRAST  10/26/2017  5:18 PM     HISTORY: Fall, head trauma. Alzheimer disease and Parkinson's.    TECHNIQUE: Axial images of the head and coronal reformations without  IV contrast material. Radiation dose for this scan was reduced using  automated exposure control, adjustment of the mA and/or kV according  to patient size, or iterative reconstruction technique.    COMPARISON: 7/28/2017    FINDINGS: There is generalized atrophy of the brain. There is no  evidence of intracranial hemorrhage, mass, acute infarct or anomaly.     The visualized portions of the sinuses and mastoids appear normal.  There is no evidence of trauma.       Impression    IMPRESSION:   1. No acute abnormality.  2. Generalized atrophy of the brain.    STANTON BURGOS MD

## 2017-10-27 NOTE — PLAN OF CARE
Problem: Patient Care Overview  Goal: Discharge Needs Assessment  Outcome: No Change  PRIMARY DIAGNOSIS: GENERALIZED WEAKNESS/FALLS     OUTPATIENT/OBSERVATION GOALS TO BE MET BEFORE DISCHARGE  1. Orthostatic performed: N/A, not able to follow commands if gets up     2. Tolerating PO medications: Awaiting meds to do dysphagia screen.  UPDATE:  Failed dysphagia prescreen.  No PO meds given.       3. Return to near baseline physical activity: No, PT order in place     4. Cleared for discharge by consultants (if involved): No,  Has SW, PT, OT ordered for 10/27/17     Discharge Planner Nurse   Safe discharge environment identified: Yes  Barriers to discharge: Yes, awaiting SW, PT, OT consult       Entered by: Purvi Mitchell 10/27/2017 12:18 AM     Please review provider order for any additional goals.   Nurse to notify provider when observation goals have been met and patient is ready for discharge.     Pt not A&Ox4.  Blood pressure elevated after movement and admission to the floor.  VPM in place.  On enteric precautions to r/o Cdiff.  Restless in bed.  Incontinent of urine and smearing stool on arrival to floor.  Right arm in cast.  Facial and left hip bruising noted.  Left hand PIV is wrapped.  Ristigmine patch on back on arrival to floor.  Not OOB this shift.  Plan:  VPM monitoring, SW, PT, OT consult.  Will be d/c to LTC facility when ready.  Will continue to monitor.      10/27/17 1:04 AM  Medications arrived to floor.  Assessed for dysphagia, pt not appropriate to participate in dysphagia screen so failed prescreen.  Not alert, not able to follow commands, garbled speech.  Meds not given, placed NPO.

## 2017-10-28 ENCOUNTER — APPOINTMENT (OUTPATIENT)
Dept: SPEECH THERAPY | Facility: CLINIC | Age: 72
DRG: 057 | End: 2017-10-28
Attending: PHYSICIAN ASSISTANT
Payer: MEDICARE

## 2017-10-28 ENCOUNTER — APPOINTMENT (OUTPATIENT)
Dept: PHYSICAL THERAPY | Facility: CLINIC | Age: 72
DRG: 057 | End: 2017-10-28
Attending: CLINICAL NURSE SPECIALIST
Payer: MEDICARE

## 2017-10-28 LAB
ANION GAP SERPL CALCULATED.3IONS-SCNC: 5 MMOL/L (ref 3–14)
BASOPHILS # BLD AUTO: 0 10E9/L (ref 0–0.2)
BASOPHILS NFR BLD AUTO: 0.3 %
BUN SERPL-MCNC: 14 MG/DL (ref 7–30)
CALCIUM SERPL-MCNC: 8.4 MG/DL (ref 8.5–10.1)
CHLORIDE SERPL-SCNC: 108 MMOL/L (ref 94–109)
CO2 SERPL-SCNC: 25 MMOL/L (ref 20–32)
CREAT SERPL-MCNC: 0.99 MG/DL (ref 0.52–1.04)
DIFFERENTIAL METHOD BLD: ABNORMAL
EOSINOPHIL # BLD AUTO: 0.1 10E9/L (ref 0–0.7)
EOSINOPHIL NFR BLD AUTO: 0.8 %
GFR SERPL CREATININE-BSD FRML MDRD: 55 ML/MIN/1.7M2
GLUCOSE SERPL-MCNC: 91 MG/DL (ref 70–99)
IMM GRANULOCYTES # BLD: 0 10E9/L (ref 0–0.4)
IMM GRANULOCYTES NFR BLD: 0.7 %
LYMPHOCYTES # BLD AUTO: 0.7 10E9/L (ref 0.8–5.3)
LYMPHOCYTES NFR BLD AUTO: 11.1 %
MONOCYTES # BLD AUTO: 0.4 10E9/L (ref 0–1.3)
MONOCYTES NFR BLD AUTO: 6.9 %
NEUTROPHILS # BLD AUTO: 4.8 10E9/L (ref 1.6–8.3)
NEUTROPHILS NFR BLD AUTO: 80.2 %
NRBC # BLD AUTO: 0 10*3/UL
NRBC BLD AUTO-RTO: 0 /100
POTASSIUM SERPL-SCNC: 4.8 MMOL/L (ref 3.4–5.3)
SODIUM SERPL-SCNC: 138 MMOL/L (ref 133–144)
WBC # BLD AUTO: 6 10E9/L (ref 4–11)

## 2017-10-28 PROCEDURE — 80048 BASIC METABOLIC PNL TOTAL CA: CPT | Performed by: INTERNAL MEDICINE

## 2017-10-28 PROCEDURE — 40000193 ZZH STATISTIC PT WARD VISIT: Performed by: PHYSICAL THERAPIST

## 2017-10-28 PROCEDURE — 12000000 ZZH R&B MED SURG/OB

## 2017-10-28 PROCEDURE — 97161 PT EVAL LOW COMPLEX 20 MIN: CPT | Mod: GP | Performed by: PHYSICAL THERAPIST

## 2017-10-28 PROCEDURE — 25000128 H RX IP 250 OP 636: Performed by: INTERNAL MEDICINE

## 2017-10-28 PROCEDURE — 25000132 ZZH RX MED GY IP 250 OP 250 PS 637: Mod: GY | Performed by: PHYSICIAN ASSISTANT

## 2017-10-28 PROCEDURE — A9270 NON-COVERED ITEM OR SERVICE: HCPCS | Mod: GY | Performed by: INTERNAL MEDICINE

## 2017-10-28 PROCEDURE — 85048 AUTOMATED LEUKOCYTE COUNT: CPT | Performed by: INTERNAL MEDICINE

## 2017-10-28 PROCEDURE — A9270 NON-COVERED ITEM OR SERVICE: HCPCS | Mod: GY | Performed by: PHYSICIAN ASSISTANT

## 2017-10-28 PROCEDURE — 40000225 ZZH STATISTIC SLP WARD VISIT: Performed by: SPEECH-LANGUAGE PATHOLOGIST

## 2017-10-28 PROCEDURE — 36415 COLL VENOUS BLD VENIPUNCTURE: CPT | Performed by: INTERNAL MEDICINE

## 2017-10-28 PROCEDURE — 85004 AUTOMATED DIFF WBC COUNT: CPT | Performed by: INTERNAL MEDICINE

## 2017-10-28 PROCEDURE — 25000132 ZZH RX MED GY IP 250 OP 250 PS 637: Mod: GY | Performed by: INTERNAL MEDICINE

## 2017-10-28 PROCEDURE — 99233 SBSQ HOSP IP/OBS HIGH 50: CPT | Performed by: INTERNAL MEDICINE

## 2017-10-28 PROCEDURE — 92610 EVALUATE SWALLOWING FUNCTION: CPT | Mod: GN | Performed by: SPEECH-LANGUAGE PATHOLOGIST

## 2017-10-28 PROCEDURE — 97530 THERAPEUTIC ACTIVITIES: CPT | Mod: GP | Performed by: PHYSICAL THERAPIST

## 2017-10-28 RX ORDER — OXYCODONE HYDROCHLORIDE 5 MG/1
5 TABLET ORAL
Status: DISCONTINUED | OUTPATIENT
Start: 2017-10-28 | End: 2017-11-01 | Stop reason: HOSPADM

## 2017-10-28 RX ADMIN — PROPRANOLOL HYDROCHLORIDE 10 MG: 10 TABLET ORAL at 20:01

## 2017-10-28 RX ADMIN — ACETAMINOPHEN 650 MG: 325 TABLET, FILM COATED ORAL at 09:41

## 2017-10-28 RX ADMIN — CARBIDOPA AND LEVODOPA 1 TABLET: 25; 100 TABLET, ORALLY DISINTEGRATING ORAL at 09:40

## 2017-10-28 RX ADMIN — CARBIDOPA AND LEVODOPA 1 TABLET: 25; 100 TABLET, ORALLY DISINTEGRATING ORAL at 13:11

## 2017-10-28 RX ADMIN — CARBIDOPA AND LEVODOPA 1 TABLET: 25; 100 TABLET, ORALLY DISINTEGRATING ORAL at 20:00

## 2017-10-28 RX ADMIN — LISINOPRIL 10 MG: 10 TABLET ORAL at 09:40

## 2017-10-28 RX ADMIN — PROPRANOLOL HYDROCHLORIDE 10 MG: 10 TABLET ORAL at 09:40

## 2017-10-28 RX ADMIN — RIVASTIGMINE 1 PATCH: 9.5 PATCH, EXTENDED RELEASE TRANSDERMAL at 09:40

## 2017-10-28 RX ADMIN — MEMANTINE 10 MG: 5 TABLET ORAL at 20:00

## 2017-10-28 RX ADMIN — POTASSIUM CHLORIDE AND SODIUM CHLORIDE: 450; 150 INJECTION, SOLUTION INTRAVENOUS at 17:30

## 2017-10-28 RX ADMIN — MEMANTINE 10 MG: 5 TABLET ORAL at 09:39

## 2017-10-28 ASSESSMENT — ACTIVITIES OF DAILY LIVING (ADL)
ADLS_ACUITY_SCORE: 20
ADLS_ACUITY_SCORE: 20
ADLS_ACUITY_SCORE: 19
ADLS_ACUITY_SCORE: 19
ADLS_ACUITY_SCORE: 20
ADLS_ACUITY_SCORE: 19

## 2017-10-28 NOTE — PROGRESS NOTES
Hospitalist Progress Note([facility])  Alfredo Maier MD 10/28/2017      Reason for Stay / current hospital problem list:    Frequent falls -mechanical     Advancing dementia and parkinson's disease             Assessment and Plan:          Summary of Stay: Roxanna Castorena is a 72 year old female admitted on 10/26/2017 with from home (in [place] with her ) for recurrent mechanical falls and overall functional decline in the last 2 wks     Problem List:   1.  Mechanical falls, recurrent  She has been living with  and has had many falls at home---more so in the last few weeks  Currently she has a right lower arm cast on for fracture (unsure details) and facial bruising  Home safety is a concern  -- PT/OT assessment and safe discharge planning      2.  Advancing dementia and parkinson's disease  -- has tremors   -- continue parkinson's medications   --CT of the head without any acute abnormality---just atrophy.   reports that she is minimally verbal at baseline - occasionally mumbles.     3.  Swallowing difficulty  -- Speech consulted and recommendation noted   -- monitor     4. Fever  -- afebrile now   -- cxr negative for pneumonia   -- no UTI   -- monitor for now          5.  HTN  -- resume home lisinopril with parameter            FEN:    >Fluids: Intravenous fluids were administered  >Electrolytes:ok    >Nutrition:   Active Diet Order      Combination Diet Dysphagia Diet Level 1: Pureed; Honey Thickened Liquids (pre-thickened or use instant food thickener) (by spoon only)      INTRAVENOUS ACCESS:    >Peripheral IV  >Central line: No central line present            Flores/Drain/Tubes:    >Flores catheter:None     >Drains : None    >Feeding tube: None    DVT Prophylaxis:  -  PCD's in place                         Code Status: DNR / DNI  Discharge Dispo: [facility] vs. [facility]    Estimated Disch Date / # of Days until Discharge: > 2 additional  "midnight              Interval History (Subjective):             Patient was seen and examined by me today and medical record reviewed.Overnight events noted and care discussed with nursing staff and treatment team.    Very frail lady working with PT   Has tremor            Physical Exam:      Last Vital Signs:  BP 93/53  Pulse 85  Temp 98.7  F (37.1  C) (Axillary)  Resp 16  Ht 1.676 m (5' 6\")  Wt 50.2 kg (110 lb 11.2 oz)  SpO2 99%  BMI 17.87 kg/m2    GEN:  Alert, oriented x 3, comfortable, NAD.  HEENT:  Normocephalic/atraumatic, PERRL, no scleral icterus, no nasal discharge, mouth moist, no oral ulcers or thrush noted.  NECK:  No clear thyromegaly of clear JVD  CV:  Regular rate and rhythm, no murmur to ausc.  S1 + S2 noted, no S3 or S4.  LUNGS:  Clear to auscultation bilaterally.  No rales/rhonchi/wheezing auscultated bilaterally.  No costal retractions bilaterally.  Symmetric chest rise on inhalation noted.  ABD:  Active bowel sounds, soft, non-tender/non-distended.  No rebound/guarding/rigidity.  No masses palpated.  No obvious HSM to exam.  EXT:  No edema or cyanosis bilaterally. No joint synovitis noted.  No calf-tenderness or asymmetry noted.  SKIN:  Dry to touch, no rashes or jaundice noted.  PSYCH:  Mood appropriate, Not tearful or depressed.  Maintains direct eye contact.  NEURO:  No tremors at rest           Medications:      All current medications were reviewed with changes reflected in problem list.         Data:        Wt Readings from Last 5 Encounters:   10/26/17 50.2 kg (110 lb 11.2 oz)   10/10/17 51.7 kg (114 lb)   08/03/17 53.5 kg (118 lb)   07/28/17 51.3 kg (113 lb 1.6 oz)   07/17/17 53.5 kg (118 lb)       I/O last 3 completed shifts:  In: 2340 [I.V.:2340]  Out: -     All laboratory and imaging data in the past 24 hours reviewed       Recent Labs  Lab 10/28/17  0657 10/27/17  1008 10/26/17  1757   WBC 6.0 7.1 10.7   HGB  --  9.8* 11.2*   HCT  --  29.4* 33.0*   MCV  --  105* 102*   PLT  -- "  112* 142*     No results for input(s): CULT in the last 168 hours.    Recent Labs  Lab 10/28/17  0657 10/27/17  1008 10/26/17  1757    139 138   POTASSIUM 4.8 3.8 3.9   CHLORIDE 108 106 105   CO2 25 28 29   ANIONGAP 5 5 4   GLC 91 99 121*   BUN 14 19 24   CR 0.99 1.07* 0.82   GFRESTIMATED 55* 50* 69   GFRESTBLACK 66 61 83   PRINCESS 8.4* 8.5 8.7   PROTTOTAL  --  6.0*  --    ALBUMIN  --  2.9*  --    BILITOTAL  --  0.9  --    ALKPHOS  --  73  --    AST  --  23  --    ALT  --  36  --          Recent Labs  Lab 10/28/17  0657 10/27/17  1008 10/26/17  1757   GLC 91 99 121*           No results for input(s): INR in the last 168 hours.      No results for input(s): TROPONIN, TROPI, TROPR in the last 168 hours.    Invalid input(s): TROP, TROPONINIES    Recent Results (from the past 48 hour(s))   XR Pelvis and Hip Left 2 Views    Narrative    PELVIS WITH LEFT HIP TWO VIEWS  10/26/2017 5:14 PM     HISTORY: Fall, left hip pain.    COMPARISON: None.      Impression    IMPRESSION:  No evidence of left hip fracture. Right femur is not well  profiled probably due to flexion, and it would be difficult to rule  out fracture in the right femur.    STANTON BURGOS MD   CT Head w/o Contrast    Narrative    CT SCAN OF THE HEAD WITHOUT CONTRAST  10/26/2017  5:18 PM     HISTORY: Fall, head trauma. Alzheimer disease and Parkinson's.    TECHNIQUE: Axial images of the head and coronal reformations without  IV contrast material. Radiation dose for this scan was reduced using  automated exposure control, adjustment of the mA and/or kV according  to patient size, or iterative reconstruction technique.    COMPARISON: 7/28/2017    FINDINGS: There is generalized atrophy of the brain. There is no  evidence of intracranial hemorrhage, mass, acute infarct or anomaly.     The visualized portions of the sinuses and mastoids appear normal.  There is no evidence of trauma.       Impression    IMPRESSION:   1. No acute abnormality.  2. Generalized atrophy  of the brain.    STANTON BURGOS MD   XR Chest Port 1 View    Narrative    XR CHEST PORT 1 VW 10/27/2017 10:45 AM    HISTORY: Fever. Assess for pneumonia.    COMPARISON: None.      Impression    IMPRESSION: A single view of the chest shows no acute or active  cardiopulmonary disease.     LIANE PALUMBO MD                 Disclaimer: This note consists of symbols derived from keyboarding, dictation and/or voice recognition software. As a result, there may be errors in the script that have gone undetected. Please consider this when interpreting information found in this chart

## 2017-10-28 NOTE — PLAN OF CARE
Problem: Patient Care Overview  Goal: Plan of Care/Patient Progress Review  Pt at baseline was living at home with spouse and ambulating her .  Chart notes recent decline in mobility with 3 falls in 2 weeks.  Spouse had arranged for placement at memory care unit this week prior to decline in mobility.    Discharge Planner PT   Patient plan for discharge: TCU vs LTC memory care  Current status: Pt needing mod-maxA x2 for bed mobility, transfers, gait  Barriers to return to prior living situation: mod-maxA x2 for all mobility skills  Recommendations for discharge: TCU if shows rehab potential in next few days or LTC memory care  Rationale for recommendations: Pt may show potential to get back recently lost mobility skills and be appropriate for TCU vs LTC        Entered by: Karime Tuttle 10/28/2017 10:07 AM

## 2017-10-28 NOTE — PROGRESS NOTES
"   Clinical Bedside Swallow Evaluation  [facility] inpatient  10/28/17 1124   General Information   Onset Date 10/26/17   Start of Care Date 10/28/17   Referring Physician Dr. Oconnell   Patient Profile Review/OT: Additional Occupational Profile Info See Profile for full history and prior level of function   Patient/Family Goals Statement Unable to state   Swallowing Evaluation Bedside swallow evaluation   Behaviorial Observations (Confused, eyes closed, mumbles, dozes easily)   Mode of current nutrition NPO   Respiratory Status Room air   Comments From H&P: \"Roxanna Castorena is a 72 year old female with history of Alzheimer and Parkinson's who presents to the emergency department today for evaluation of fall. The patient's  reports that the patient two weeks ago with wrist fracture, five days ago with bruising to her face, and again this morning. The patient's  reports that he put her to bed and approximately 30 minutes later he came back inside and found that she fell on her left hip on her way to the bathroom with associated bowel incontinence. She was able to ambulate afterwards but was limping. Therefore, she presents to the emergency department for evaluation. Of note, they had an appointment today to get the patient into a nursing home but was unable to go due to the fall this morning.\"     Pt upright in chair for evaluation following PT. Pt with eyes closed, open mouth posture, snoring but easily arouses to voice, touch and interaction. Cont with eyes closed. Unable to follow directions or answer appropriately.     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 Gets nausea with 10mg tab     DEVIATED SEPTUM/NASAL " CONGESTION      Dysphagia 10/31/2013     HTN (hypertension) 7/5/2012     LEFT BREAST TENDERNESS 7/02    Negative U/S     MIGRAINE HEADACHE 5/98     Monoclonal gammopathy      NASAL TIP SKIN LESION 5/03    Actinic keratosis     Osteopenia 10/31/2013     Other chronic pain      Parkinsons disease (H)      Synovitis and tenosynovitis, unspecified 4/03    Bilateral thumbs, s/p cortisone injections     Tremor 10/31/2013     Trigeminal neuralgia 8/00    Negative MRI Head     Unspecified cerebral artery occlusion with cerebral infarction     hx tia's yrs ago       Clinical Swallow Evaluation   Oral Musculature generally intact;unable to assess due to poor participation/comprehension  (generalized weakness, incoordination, decreased ROM)   Dentition present and adequate   Mucosal Quality dry   Clinical Swallow Eval: Thin Liquid Texture Trial   Mode of Presentation, Thin Liquids cup;spoon;straw;fed by clinician   Volume of Liquid or Food Presented sips of water; able to suck from straw on x1 attempt but unable on further attempts   Oral Phase of Swallow Premature pharyngeal entry  (incoordination)   Pharyngeal Phase of Swallow coughing/choking;wet vocal quality after swallow;repeated swallows   Diagnostic Statement + s/sx of aspiration; incoordination and premature entry. Decreased lip seal for cup and spoon with startle reflex at times upon presentation.   Clinical Swallow Eval: Nectar Thick Liquid Texture Trial   Mode of Presentation, Nectar spoon;fed by clinician   Volume of Nectar Presented tsp water    Oral Phase, Nectar Premature pharyngeal entry  (incoordination)   Oral Residue, Nectar (occasional loss of bolus)   Pharyngeal Phase, Nectar repeated swallows;throat clearing   Diagnostic Statement + s/sx of aspiration; incoordination and premature entry. Decreased lip seal for cup and spoon with startle reflex at times upon presentation.   Clinical Swallow Eval: Honey Thick Liquid Texture Trial   Mode of Presentation,  Honey spoon;fed by clinician   Volume of Honey Presented tsp water   Oral Phase, Honey (variable coordination but improved from thin/nect)   Pharyngeal Phase, Honey reduction in laryngeal movement   Diagnostic Statement No overt s/sx of aspiration. Decreased laryngeal elevation, weakness ? pharyngeal residue.   Clinical Swallow Eval: Puree Solid Texture Trial   Mode of Presentation, Puree spoon;fed by clinician   Volume of Puree Presented tsp bites applesauce, whole pills in applesauce with RN   Oral Phase, Puree (poor lip seal on spoon, needs verbal cues for awareness)   Pharyngeal Phase, Puree reduction in laryngeal movement   Diagnostic Statement Appears WFL at slow rate, small bites/sips, total feeding assist and close monitoring   General Therapy Interventions   Planned Therapy Interventions Dysphagia Treatment   Dysphagia treatment Modified diet education;Instruction of safe swallow strategies;Compensatory strategies for swallowing   Swallow Eval: Clinical Impressions   Skilled Criteria for Therapy Intervention Skilled criteria met.  Treatment indicated.   Functional Assessment Scale (FAS) 2   Dysphagia Outcome Severity Scale (GISELA) Level 2 - GISELA   Diet texture recommendations Dysphagia diet level 1;Honey thick liquids  (liquids via SPOON)   Recommended Feeding/Eating Techniques alternate between small bites and sips of food/liquid;check mouth frequently for oral residue/pocketing;maintain upright posture during/after eating for 30 mins;no straws;small sips/bites   Demonstrates Need for Referral to Another Service (Palliative Care)   Therapy Frequency daily   Predicted Duration of Therapy Intervention (days/wks) x1 week   Anticipated Discharge Disposition extended care facility   Risks and Benefits of Treatment have been explained. Yes   Patient, family and/or staff in agreement with Plan of Care Yes   Clinical Impression Comments Pt able to tolerate tsp bites of applesauce with max verbal, tactile cues for  readiness, awareness of feeding. Decreased oral awareness and coordinating for thin, nectar liquids via cup and spoon. + s/sx of aspiration with thin, nectar-thick liquids (wet vocal quality, delayed cough). Pt observed taking meds whole in applesauce with tendency to chew but able to clear oral cavity. Decreased laryngeal elevation, ? Residue. Recommend: 1. Cautious initiation of Dysphagia Diet Level 1 (Puree) with HONEY-thick liquids (liquids via SPOON). 1:1 feeding assist with max cues for awareness, readiness for PO, slow rate, small bites/sips, alternate liquids/solids. Meds whole in applesauce as tolerated, otherwise, crushed. 2. SLP will cont daily for PO tolerance, adjustment of diet PRN   Total Evaluation Time   Total Evaluation Time (Minutes) 10

## 2017-10-28 NOTE — PLAN OF CARE
Problem: Patient Care Overview  Goal: Plan of Care/Patient Progress Review     SLP- Clinical bedside swallow evaluation completed. Pt able to participate with encouragement. Attempts to follow some simple directions, occasional intelligible words but often mumbling in response. Dozes easily but awakens to voice, touch. Open mouth posture during sleep and upright. Eyes mostly closed with effort keeps them open.     Discharge Planner SLP   Patient plan for discharge: pt unable to state, memory care per chart  Current status: Pt able to tolerate tsp bites of applesauce with max verbal, tactile cues for readiness, awareness of feeding. Decreased oral awareness and coordinating for thin, nectar liquids via cup and spoon. + s/sx of aspiration with thin, nectar-thick liquids (wet vocal quality, delayed cough). Pt observed taking meds whole in applesauce with tendency to chew but able to clear oral cavity. Decreased laryngeal elevation, ? Residue. Recommend: 1. Cautious initiation of Dysphagia Diet Level 1 (Puree) with HONEY-thick liquids (liquids via SPOON). 1:1 feeding assist with max cues for awareness, readiness for PO, slow rate, small bites/sips, alternate liquids/solids. Meds whole in applesauce as tolerated, otherwise, crushed. 2. SLP will cont daily for PO tolerance, adjustment of diet PRN  Barriers to return to prior living situation: Falls, weakness, dementia  Recommendations for discharge: Memory care as planned if able to provide for all needs; likely ongoing SLP for most appropriate diet vs LTC  Rationale for recommendations: Dysphagia, not at baseline diet       Entered by: Gurinder Acevedo 10/28/2017 9:52 AM

## 2017-10-28 NOTE — PROGRESS NOTES
10/28/17 0900   Quick Adds   Type of Visit Initial PT Evaluation   Living Environment   Lives With spouse   Living Arrangements house   Home Accessibility (Unclear of details, spouse not present, pt nonverbal)   Living Environment Comment Spouse had made arrangements for pt to be placed in memory care unit this week   Self-Care   Activity/Exercise/Self-Care Comment Per nurse  stated pt was ambulating at home; chart notes 3 mechanical falls in past 2 weeks with R lower arm in cast 2/2 fx and facial bruising.     Functional Level Prior   Communication 3-->unable to understand or speak (not related to language barrier)   Cognition (advanced dementia)   Fall history within last six months (Chart notes 3 falls in past 2 weeks)   General Information   Onset of Illness/Injury or Date of Surgery - Date 10/26/17   Referring Physician Itzel THOMAS CNS   Patient/Family Goals Statement Placement at memory care   Pertinent History of Current Problem (include personal factors and/or comorbidities that impact the POC) Pt has hx of advanced dementia, Parkinsons, CKD stage 3, HTN, frequent falls in the home with recent decrease in mobility skiolls   Precautions/Limitations fall precautions   Cognitive Status Examination   Orientation not oriented to person, place or time   Follows Commands and Answers Questions unable to follow commands;unable to answer questions   Personal Safety and Judgment impaired   Memory impaired   Posture    Posture Forward head position;Protracted shoulders;Kyphosis   Range of Motion (ROM)   ROM Comment rigidity 2/2 Parkinsons   Strength   Strength Comments unable to assess   Bed Mobility   Bed Mobility Comments supine<sit with modA x2   Transfer Skills   Transfer Comments sit<>stand modA x2   Gait   Gait Comments Pt able to take short shuffling steps to chair 3 ft away, had difficulty with turning to sit safely in chair, up to mod-maxA x2   Balance   Balance Comments Initially  "unable to support self at EOB in sitting, with time and facilitation able to hold self for short periods in sitting without support.     Muscle Tone   Muscle Tone Comments tremor at rest, rigidity   General Therapy Interventions   Planned Therapy Interventions gait training;transfer training   Clinical Impression   Criteria for Skilled Therapeutic Intervention yes, treatment indicated   PT Diagnosis Impaired mobility skills   Influenced by the following impairments weakness, rigidity, dementia, tremors   Functional limitations due to impairments bed mobility, transfers, gait   Clinical Presentation Stable/Uncomplicated   Clinical Presentation Rationale PT mobility eval, chart review   Clinical Decision Making (Complexity) Low complexity   Therapy Frequency` daily   Predicted Duration of Therapy Intervention (days/wks) 3-4 days   Anticipated Discharge Disposition (TCU vs LTC memory)   Risk & Benefits of therapy have been explained Yes   Patient, Family & other staff in agreement with plan of care Yes   [facility] TM \"6 Clicks\"   2016, Trustees of [facility], under license to [employer].  All rights reserved.   6 Clicks Short Forms Basic Mobility Inpatient Short Form   [facility]  \"6 Clicks\" V.2 Basic Mobility Inpatient Short Form   1. Turning from your back to your side while in a flat bed without using bedrails? 2 - A Lot   2. Moving from lying on your back to sitting on the side of a flat bed without using bedrails? 2 - A Lot   3. Moving to and from a bed to a chair (including a wheelchair)? 2 - A Lot   4. Standing up from a chair using your arms (e.g., wheelchair, or bedside chair)? 2 - A Lot   5. To walk in hospital room? 2 - A Lot   6. Climbing 3-5 steps with a railing? 1 - Total   Basic Mobility Raw Score (Score out of 24.Lower scores equate to lower levels of function) 11   Total Evaluation Time   Total Evaluation Time (Minutes) 10     "

## 2017-10-28 NOTE — PLAN OF CARE
Problem: Patient Care Overview  Goal: Plan of Care/Patient Progress Review  More alert this shift. Was up in chair for breakfast. Speech cleared her for D1 with honey thick. No swallowing difficulties noted during breakfast but is unable to clear secretions so required suction with yankuer. Getting back to bed patient was heavy assist of 2 with minimal participation. Incontinent of urine. Turned and repositiond q2H. Tylenol for pain.

## 2017-10-29 ENCOUNTER — APPOINTMENT (OUTPATIENT)
Dept: PHYSICAL THERAPY | Facility: CLINIC | Age: 72
DRG: 057 | End: 2017-10-29
Payer: MEDICARE

## 2017-10-29 PROCEDURE — A9270 NON-COVERED ITEM OR SERVICE: HCPCS | Mod: GY | Performed by: PHYSICIAN ASSISTANT

## 2017-10-29 PROCEDURE — 99207 ZZC CDG-MDM COMPONENT: MEETS MODERATE - UP CODED: CPT | Performed by: INTERNAL MEDICINE

## 2017-10-29 PROCEDURE — 97530 THERAPEUTIC ACTIVITIES: CPT | Mod: GP | Performed by: PHYSICAL THERAPIST

## 2017-10-29 PROCEDURE — 40000193 ZZH STATISTIC PT WARD VISIT: Performed by: PHYSICAL THERAPIST

## 2017-10-29 PROCEDURE — 25000132 ZZH RX MED GY IP 250 OP 250 PS 637: Mod: GY | Performed by: INTERNAL MEDICINE

## 2017-10-29 PROCEDURE — 12000000 ZZH R&B MED SURG/OB

## 2017-10-29 PROCEDURE — 25000132 ZZH RX MED GY IP 250 OP 250 PS 637: Mod: GY | Performed by: PHYSICIAN ASSISTANT

## 2017-10-29 PROCEDURE — 99233 SBSQ HOSP IP/OBS HIGH 50: CPT | Performed by: INTERNAL MEDICINE

## 2017-10-29 PROCEDURE — A9270 NON-COVERED ITEM OR SERVICE: HCPCS | Mod: GY | Performed by: INTERNAL MEDICINE

## 2017-10-29 RX ADMIN — CARBIDOPA AND LEVODOPA 1 TABLET: 25; 100 TABLET, ORALLY DISINTEGRATING ORAL at 15:45

## 2017-10-29 RX ADMIN — MEMANTINE 10 MG: 5 TABLET ORAL at 09:08

## 2017-10-29 RX ADMIN — RIVASTIGMINE 1 PATCH: 9.5 PATCH, EXTENDED RELEASE TRANSDERMAL at 09:09

## 2017-10-29 RX ADMIN — CARBIDOPA AND LEVODOPA 1 TABLET: 25; 100 TABLET, ORALLY DISINTEGRATING ORAL at 09:08

## 2017-10-29 RX ADMIN — PROPRANOLOL HYDROCHLORIDE 10 MG: 10 TABLET ORAL at 22:30

## 2017-10-29 RX ADMIN — PROPRANOLOL HYDROCHLORIDE 10 MG: 10 TABLET ORAL at 09:09

## 2017-10-29 RX ADMIN — CARBIDOPA AND LEVODOPA 1 TABLET: 25; 100 TABLET, ORALLY DISINTEGRATING ORAL at 22:30

## 2017-10-29 RX ADMIN — LISINOPRIL 10 MG: 10 TABLET ORAL at 09:08

## 2017-10-29 RX ADMIN — MEMANTINE 10 MG: 5 TABLET ORAL at 22:31

## 2017-10-29 ASSESSMENT — ACTIVITIES OF DAILY LIVING (ADL)
ADLS_ACUITY_SCORE: 19

## 2017-10-29 NOTE — PLAN OF CARE
Problem: Patient Care Overview  Goal: Plan of Care/Patient Progress Review  Discharge Planner PT   Patient plan for discharge: unable to state  Current status: Pt needing up to maxA x2 with transfers and short steps to transfer bed to chair  Barriers to return to prior living situation: Level of assist needed for transfers (max x2), multiple falls in the home  Recommendations for discharge: TCU (if medical reason found for recent sudden decline in status and potential for rehab to regain) vs LTC memory care  Rationale for recommendations: Pt may have rehab potential at TCU if acute decline for medical reason identified, otherwise will need LTC memory unit for chronic dementia Parkinsons progression and need for 24hr care.         Entered by: Karime Tuttle 10/29/2017 9:04 AM

## 2017-10-29 NOTE — PROGRESS NOTES
Hospitalist Progress Note(Formerly [facility])  Alfredo Maier MD 10/29/2017      Reason for Stay / current hospital problem list:    Frequent falls -mechanical     Advancing dementia and parkinson's disease             Assessment and Plan:          Summary of Stay: Roxanna Castorena is a 72 year old female admitted on 10/26/2017 with from home (in [place] with her ) for recurrent mechanical falls and overall functional decline in the last 2 wks     Problem List:   1.  Mechanical falls, recurrent  She has been living with  and has had many falls at home---more so in the last few weeks  Currently she has a right lower arm cast on for fracture (unsure details) and facial bruising  Home safety is a concern  -- PT/OT assessment and safe discharge planning      2.  Advancing dementia and parkinson's disease  -- has tremors   -- continue parkinson's medications   --CT of the head without any acute abnormality---just atrophy.   reports that she is minimally verbal at baseline - occasionally mumbles.     3.  Swallowing difficulty  -- Speech consulted and recommendation noted   -- monitor     4. Fever  -- afebrile now   -- cxr negative for pneumonia   -- no UTI   -- monitor for now          5.  HTN  -- resume home lisinopril with parameter            FEN:    >Fluids: Intravenous fluids were administered  >Electrolytes:ok    >Nutrition:   Active Diet Order      Combination Diet Dysphagia Diet Level 1: Pureed; Honey Thickened Liquids (pre-thickened or use instant food thickener) (by spoon only)      INTRAVENOUS ACCESS:    >Peripheral IV  >Central line: No central line present            Flores/Drain/Tubes:    >Flores catheter:None     >Drains : None    >Feeding tube: None    DVT Prophylaxis:  -  PCD's in place                         Code Status: DNR / DNI  Discharge Dispo: TCU vs. Comfort care    Estimated Disch Date / # of Days until Discharge: in next 2 days if safe  "disposition is possible.               Interval History (Subjective):             Patient was seen and examined by me today and medical record reviewed.Overnight events noted and care discussed with nursing staff and treatment team.  Confused but better and calm this morning            Physical Exam:      Last Vital Signs:  /62 (BP Location: Left arm)  Pulse 81  Temp 97.9  F (36.6  C) (Oral)  Resp 16  Ht 1.676 m (5' 6\")  Wt 50.2 kg (110 lb 11.2 oz)  SpO2 99%  BMI 17.87 kg/m2    GEN:  Alert, comfortable, NAD.  HEENT:  Normocephalic/atraumatic, PERRL, no scleral icterus, no nasal discharge, mouth moist, no oral ulcers or thrush noted.  NECK:  No clear thyromegaly of clear JVD  CV:  Regular rate and rhythm, no murmur to ausc.  S1 + S2 noted, no S3 or S4.  LUNGS:  Clear to auscultation bilaterally.  No rales/rhonchi/wheezing auscultated bilaterally.  No costal retractions bilaterally.  Symmetric chest rise on inhalation noted.  ABD:  Active bowel sounds, soft, non-tender/non-distended.  No rebound/guarding/rigidity.  No masses palpated.  No obvious HSM to exam.  EXT:  No edema or cyanosis bilaterally. No joint synovitis noted.  No calf-tenderness or asymmetry noted.  SKIN:  Dry to touch, no rashes or jaundice noted.  PSYCH:  Mood appropriate, Not tearful or depressed.  Maintains direct eye contact.  NEURO:  No tremors at rest           Medications:      All current medications were reviewed with changes reflected in problem list.         Data:        Wt Readings from Last 5 Encounters:   10/26/17 50.2 kg (110 lb 11.2 oz)   10/10/17 51.7 kg (114 lb)   08/03/17 53.5 kg (118 lb)   07/28/17 51.3 kg (113 lb 1.6 oz)   07/17/17 53.5 kg (118 lb)       I/O last 3 completed shifts:  In: 1637 [P.O.:180; I.V.:1457]  Out: -     All laboratory and imaging data in the past 24 hours reviewed       Recent Labs  Lab 10/28/17  0657 10/27/17  1008 10/26/17  1757   WBC 6.0 7.1 10.7   HGB  --  9.8* 11.2*   HCT  --  29.4* 33.0* "   MCV  --  105* 102*   PLT  --  112* 142*     No results for input(s): CULT in the last 168 hours.    Recent Labs  Lab 10/28/17  0657 10/27/17  1008 10/26/17  1757    139 138   POTASSIUM 4.8 3.8 3.9   CHLORIDE 108 106 105   CO2 25 28 29   ANIONGAP 5 5 4   GLC 91 99 121*   BUN 14 19 24   CR 0.99 1.07* 0.82   GFRESTIMATED 55* 50* 69   GFRESTBLACK 66 61 83   PRINCESS 8.4* 8.5 8.7   PROTTOTAL  --  6.0*  --    ALBUMIN  --  2.9*  --    BILITOTAL  --  0.9  --    ALKPHOS  --  73  --    AST  --  23  --    ALT  --  36  --          Recent Labs  Lab 10/28/17  0657 10/27/17  1008 10/26/17  1757   GLC 91 99 121*           No results for input(s): INR in the last 168 hours.      No results for input(s): TROPONIN, TROPI, TROPR in the last 168 hours.    Invalid input(s): TROP, TROPONINIES    Recent Results (from the past 48 hour(s))   XR Pelvis and Hip Left 2 Views    Narrative    PELVIS WITH LEFT HIP TWO VIEWS  10/26/2017 5:14 PM     HISTORY: Fall, left hip pain.    COMPARISON: None.      Impression    IMPRESSION:  No evidence of left hip fracture. Right femur is not well  profiled probably due to flexion, and it would be difficult to rule  out fracture in the right femur.    STANTON BURGOS MD   CT Head w/o Contrast    Narrative    CT SCAN OF THE HEAD WITHOUT CONTRAST  10/26/2017  5:18 PM     HISTORY: Fall, head trauma. Alzheimer disease and Parkinson's.    TECHNIQUE: Axial images of the head and coronal reformations without  IV contrast material. Radiation dose for this scan was reduced using  automated exposure control, adjustment of the mA and/or kV according  to patient size, or iterative reconstruction technique.    COMPARISON: 7/28/2017    FINDINGS: There is generalized atrophy of the brain. There is no  evidence of intracranial hemorrhage, mass, acute infarct or anomaly.     The visualized portions of the sinuses and mastoids appear normal.  There is no evidence of trauma.       Impression    IMPRESSION:   1. No acute  abnormality.  2. Generalized atrophy of the brain.    STANTON BURGOS MD   XR Chest Port 1 View    Narrative    XR CHEST PORT 1 VW 10/27/2017 10:45 AM    HISTORY: Fever. Assess for pneumonia.    COMPARISON: None.      Impression    IMPRESSION: A single view of the chest shows no acute or active  cardiopulmonary disease.     LIANE PALUMBO MD                 Disclaimer: This note consists of symbols derived from keyboarding, dictation and/or voice recognition software. As a result, there may be errors in the script that have gone undetected. Please consider this when interpreting information found in this chart

## 2017-10-29 NOTE — PLAN OF CARE
Problem: Patient Care Overview  Goal: Plan of Care/Patient Progress Review     SLP - Pt up in chair, sleeping. Attempt to rouse for PO trials. Pt unable to maintain alertness for PO. Will f/u tomorrow 10/30. Per notes, pt appears to tolerate Dysphagia Diet Level 1 (Puree) with HONEY-thick liquids when alert. Hold PO as needed. Await Palliative Care consult Monday 10/30.

## 2017-10-30 PROCEDURE — 99231 SBSQ HOSP IP/OBS SF/LOW 25: CPT | Performed by: CLINICAL NURSE SPECIALIST

## 2017-10-30 PROCEDURE — 25000132 ZZH RX MED GY IP 250 OP 250 PS 637: Mod: GY | Performed by: INTERNAL MEDICINE

## 2017-10-30 PROCEDURE — 25000132 ZZH RX MED GY IP 250 OP 250 PS 637: Mod: GY | Performed by: PHYSICIAN ASSISTANT

## 2017-10-30 PROCEDURE — 12000000 ZZH R&B MED SURG/OB

## 2017-10-30 PROCEDURE — 99233 SBSQ HOSP IP/OBS HIGH 50: CPT | Performed by: INTERNAL MEDICINE

## 2017-10-30 PROCEDURE — A9270 NON-COVERED ITEM OR SERVICE: HCPCS | Mod: GY | Performed by: INTERNAL MEDICINE

## 2017-10-30 PROCEDURE — 99207 ZZC CDG-MDM COMPONENT: MEETS MODERATE - UP CODED: CPT | Performed by: INTERNAL MEDICINE

## 2017-10-30 PROCEDURE — A9270 NON-COVERED ITEM OR SERVICE: HCPCS | Mod: GY | Performed by: PHYSICIAN ASSISTANT

## 2017-10-30 RX ADMIN — CARBIDOPA AND LEVODOPA 1 TABLET: 25; 100 TABLET, ORALLY DISINTEGRATING ORAL at 21:08

## 2017-10-30 RX ADMIN — CARBIDOPA AND LEVODOPA 1 TABLET: 25; 100 TABLET, ORALLY DISINTEGRATING ORAL at 14:13

## 2017-10-30 RX ADMIN — MEMANTINE 10 MG: 5 TABLET ORAL at 09:13

## 2017-10-30 RX ADMIN — PROPRANOLOL HYDROCHLORIDE 10 MG: 10 TABLET ORAL at 09:13

## 2017-10-30 RX ADMIN — PROPRANOLOL HYDROCHLORIDE 10 MG: 10 TABLET ORAL at 21:08

## 2017-10-30 RX ADMIN — MEMANTINE 10 MG: 5 TABLET ORAL at 21:08

## 2017-10-30 RX ADMIN — CARBIDOPA AND LEVODOPA 1 TABLET: 25; 100 TABLET, ORALLY DISINTEGRATING ORAL at 09:13

## 2017-10-30 RX ADMIN — LISINOPRIL 10 MG: 10 TABLET ORAL at 09:13

## 2017-10-30 RX ADMIN — RIVASTIGMINE 1 PATCH: 9.5 PATCH, EXTENDED RELEASE TRANSDERMAL at 09:13

## 2017-10-30 ASSESSMENT — ACTIVITIES OF DAILY LIVING (ADL)
ADLS_ACUITY_SCORE: 23
ADLS_ACUITY_SCORE: 19
ADLS_ACUITY_SCORE: 23
ADLS_ACUITY_SCORE: 23
ADLS_ACUITY_SCORE: 19
ADLS_ACUITY_SCORE: 19

## 2017-10-30 NOTE — PLAN OF CARE
Problem: Patient Care Overview  Goal: Plan of Care/Patient Progress Review  Attempted to see patient for swallow treatment, however, PT with nursing and requiring assistance at this time.  RN reported patient seems to tolerate dysphagia diet level 1 with honey thick liquids when alert.  Variable level of alertness reported.  Will reattempt today as able and as appropriate.

## 2017-10-30 NOTE — PROGRESS NOTES
CM: spoke with Chon and the RN staff at [facility]. They both feel pt would benefit from TCU. At baseline pt was independent with her mobility. Since her fall pt is now an assist of 2 and only Pivot transfer  [facility] will send referrals for TCU placement to  [facility] [phone] Fax [phone] yolanda Yenni  And [facility] [phone] fax [phone]     Will fax order to Dave Meza Rn [phone] Fx [phone] at [facility] as a back up for placement if needed.

## 2017-10-30 NOTE — PLAN OF CARE
Problem: Patient Care Overview  Goal: Plan of Care/Patient Progress Review  Outcome: No Change  VSS.  Confused and sleeping t/o shift.  Incontinent of urine x1.  Turned and repsotioned q2h.  Will monitor.

## 2017-10-30 NOTE — PROGRESS NOTES
Hospitalist Progress Note([facility])  Alfredo Maier MD 10/30/2017      Reason for Stay / current hospital problem list:    Frequent falls -mechanical     Advancing dementia and parkinson's disease             Assessment and Plan:          Summary of Stay: Roxanna Castorena is a 72 year old female admitted on 10/26/2017 with from home (in [place] with her ) for recurrent mechanical falls and overall functional decline in the last 2 wks     Problem List:   1.  Mechanical falls, recurrent  She has been living with  and has had many falls at home---more so in the last few weeks  Currently she has a right lower arm cast on for fracture (unsure details) and facial bruising  Home safety is a concern  -- PT/OT assessment and safe discharge planning   -- at risk of fall and injury and needs close monitoring      2.  Advancing dementia and parkinson's disease  -- has tremors   -- continue parkinson's medications   --CT of the head without any acute abnormality---just atrophy.   reports that she is minimally verbal at baseline - occasionally mumbles.     3.  Swallowing difficulty  -- Speech consulted and recommendation noted   -- monitor     4. Fever  -- afebrile now   -- cxr negative for pneumonia   -- no UTI   -- monitor for now          5.  HTN  -- resume home lisinopril with parameter            FEN:    >Fluids: Intravenous fluids were administered  >Electrolytes:ok    >Nutrition:   Active Diet Order      Combination Diet Dysphagia Diet Level 1: Pureed; Honey Thickened Liquids (pre-thickened or use instant food thickener) (by spoon only)      INTRAVENOUS ACCESS:    >Peripheral IV  >Central line: No central line present            Flores/Drain/Tubes:    >Flores catheter:None     >Drains : None    >Feeding tube: None    DVT Prophylaxis:  -  PCD's in place                         Code Status: DNR / DNI  Discharge Dispo: long term care     Estimated Disch Date / # of  "Days until Discharge:  In next one day if disposition is possible               Interval History (Subjective):             Patient was seen and examined by me today and medical record reviewed.Overnight events noted and care discussed with nursing staff and treatment team.  Somnolent this morning  I spoke with  who is looking at [facility] in [facility]            Physical Exam:      Last Vital Signs:  /63 (BP Location: Left arm)  Pulse 81  Temp 96.9  F (36.1  C) (Axillary)  Resp 16  Ht 1.676 m (5' 6\")  Wt 50.2 kg (110 lb 11.2 oz)  SpO2 98%  BMI 17.87 kg/m2    GEN:  Alert, comfortable, NAD.  HEENT:  Normocephalic/atraumatic, PERRL, no scleral icterus, no nasal discharge, mouth moist, no oral ulcers or thrush noted.  NECK:  No clear thyromegaly of clear JVD  CV:  Regular rate and rhythm, no murmur to ausc.  S1 + S2 noted, no S3 or S4.  LUNGS:  Clear to auscultation bilaterally.  No rales/rhonchi/wheezing auscultated bilaterally.  No costal retractions bilaterally.  Symmetric chest rise on inhalation noted.  ABD:  Active bowel sounds, soft, non-tender/non-distended.  No rebound/guarding/rigidity.  No masses palpated.  No obvious HSM to exam.  EXT:  No edema or cyanosis bilaterally. No joint synovitis noted.  No calf-tenderness or asymmetry noted.  SKIN:  Dry to touch, no rashes or jaundice noted.  PSYCH:  Mood appropriate, Not tearful or depressed.  Maintains direct eye contact.  NEURO:  No tremors at rest           Medications:      All current medications were reviewed with changes reflected in problem list.         Data:        Wt Readings from Last 5 Encounters:   10/26/17 50.2 kg (110 lb 11.2 oz)   10/10/17 51.7 kg (114 lb)   08/03/17 53.5 kg (118 lb)   07/28/17 51.3 kg (113 lb 1.6 oz)   07/17/17 53.5 kg (118 lb)       I/O last 3 completed shifts:  In: 600 [P.O.:600]  Out: -     All laboratory and imaging data in the past 24 hours reviewed       Recent Labs  Lab 10/28/17  0657 10/27/17  1008 " 10/26/17  1757   WBC 6.0 7.1 10.7   HGB  --  9.8* 11.2*   HCT  --  29.4* 33.0*   MCV  --  105* 102*   PLT  --  112* 142*     No results for input(s): CULT in the last 168 hours.    Recent Labs  Lab 10/28/17  0657 10/27/17  1008 10/26/17  1757    139 138   POTASSIUM 4.8 3.8 3.9   CHLORIDE 108 106 105   CO2 25 28 29   ANIONGAP 5 5 4   GLC 91 99 121*   BUN 14 19 24   CR 0.99 1.07* 0.82   GFRESTIMATED 55* 50* 69   GFRESTBLACK 66 61 83   PRINCESS 8.4* 8.5 8.7   PROTTOTAL  --  6.0*  --    ALBUMIN  --  2.9*  --    BILITOTAL  --  0.9  --    ALKPHOS  --  73  --    AST  --  23  --    ALT  --  36  --          Recent Labs  Lab 10/28/17  0657 10/27/17  1008 10/26/17  1757   GLC 91 99 121*           No results for input(s): INR in the last 168 hours.      No results for input(s): TROPONIN, TROPI, TROPR in the last 168 hours.    Invalid input(s): TROP, TROPONINIES    Recent Results (from the past 48 hour(s))   XR Pelvis and Hip Left 2 Views    Narrative    PELVIS WITH LEFT HIP TWO VIEWS  10/26/2017 5:14 PM     HISTORY: Fall, left hip pain.    COMPARISON: None.      Impression    IMPRESSION:  No evidence of left hip fracture. Right femur is not well  profiled probably due to flexion, and it would be difficult to rule  out fracture in the right femur.    STANTON BURGOS MD   CT Head w/o Contrast    Narrative    CT SCAN OF THE HEAD WITHOUT CONTRAST  10/26/2017  5:18 PM     HISTORY: Fall, head trauma. Alzheimer disease and Parkinson's.    TECHNIQUE: Axial images of the head and coronal reformations without  IV contrast material. Radiation dose for this scan was reduced using  automated exposure control, adjustment of the mA and/or kV according  to patient size, or iterative reconstruction technique.    COMPARISON: 7/28/2017    FINDINGS: There is generalized atrophy of the brain. There is no  evidence of intracranial hemorrhage, mass, acute infarct or anomaly.     The visualized portions of the sinuses and mastoids appear  normal.  There is no evidence of trauma.       Impression    IMPRESSION:   1. No acute abnormality.  2. Generalized atrophy of the brain.    STANTON UBRGOS MD   XR Chest Port 1 View    Narrative    XR CHEST PORT 1 VW 10/27/2017 10:45 AM    HISTORY: Fever. Assess for pneumonia.    COMPARISON: None.      Impression    IMPRESSION: A single view of the chest shows no acute or active  cardiopulmonary disease.     LIANE PALUMBO MD                 Disclaimer: This note consists of symbols derived from keyboarding, dictation and/or voice recognition software. As a result, there may be errors in the script that have gone undetected. Please consider this when interpreting information found in this chart

## 2017-10-30 NOTE — PROGRESS NOTES
[facility]  Palliative Care Progress Note  Text Page    Met with the patient's /healthcare agent Chon Castorena to complete POLST indicating DNR/DNI.      Assessment & Plan   1. Dementia - patient is not verbal and does not demonstrate medical capacity. Her /healthcare agent Chon Castorena is assisting in care plan.     2. Weakness - dismissal to Memory Care at [facility] in [facility].     3. Dysphagia - Appreciate Speech Therapist input and recommendation for Dysphagia Diet Level 1 (Puree) with HONEY-thick liquids .      4. Goals of Care: DNR/DNI - Restorative care.  POLST complete for dismissal.    Itzel Marquez MS, RN, CNS, APRN, ACHPN, FAACVPR  Pain and Palliative Care  Pager [phone]  Office [phone]       Time Spent on this Encounter   I spent  15 minutes in assessment of the patient and discussion with her /healthcare agent. Another 10 minutes in review of chart, documentation and discussion with the health care team.    Interval History   Chart reviewed    Review of Systems    Unable to complete as patient is not verbal    Physical Exam   Temp:  [97.9  F (36.6  C)-98  F (36.7  C)] 98  F (36.7  C)  Heart Rate:  [72-76] 75  Resp:  [16] 16  BP: (145-158)/(59-89) 158/89  SpO2:  [95 %-99 %] 95 %  110 lbs 11.2 oz  GEN:  Alert, mumbles but does not answer questions, appears comfortable, NAD.  HEENT:  Normocephalic/atraumatic, no scleral icterus, no nasal discharge, mouth moist.  RESP:  Symmetric chest rise on inhalation noted.  Normal respiratory effort.    Medications        carbidopa-levodopa  1 tablet Oral TID     rivastigmine   Transdermal Daily     rivastigmine   Transdermal Q8H     lisinopril  10 mg Oral Daily     memantine  10 mg Oral BID     propranolol  10 mg Oral BID     rivastigmine  1 patch Transdermal Daily       Data   No results found for this or any previous visit (from the past 24 hour(s)).

## 2017-10-30 NOTE — SIGNIFICANT EVENT
Fall    RN entered room to find patient laying on back on the floor. Patient was previously sitting in recliner with feet up and reclined. [place] sitter sensor was under patient, but found to not be turned on, so no alarms sounded when patient got up from chair. Neuro assessment performed - disoriented x4 (patient has advanced dementia - this finding is consistent with previous assessment prior to fall). PEERL. Though patient was unable to follow commands (consistent with assessment prior to fall), patient did demonstrate purposeful movement of all four extremities. No new injuries/skin tears/bruises found on assessment. With 3 RNs present, patient was lifted back to bed utilizing ceiling lift with no outward evidence of pain or discomfort from patient. Patient unable to verbalize if having pain (again, consistent with assessment prior to fall). MD was notified and came to see patient - monitoring for now.     Patient's , Chon, notified via phone.

## 2017-10-30 NOTE — PLAN OF CARE
Problem: Patient Care Overview  Goal: Plan of Care/Patient Progress Review  Outcome: No Change  Patient remains hospitalized following fall at home. Remains disoriented x4, with intermittent periods of lethargy and agitation. Patient did have fall during this shift - see previous note. Neuro assessment remains unchanged. Tolerating DD1 + honey thick liquids - requiring total feed assistance. VSS. Incontinent of urine. Requiring assist of two and gait belt to safely ambulate. Alarms on for safety.

## 2017-10-30 NOTE — PROGRESS NOTES
[facility]    Patient Name: Roxanna Castorena  [id number]  Services received on: 10/30/2017    MEDICAL MUSIC THERAPY PROGRESS NOTE  INITIAL ASSESSMENT    Referral made by: Humberto Zuniga RN  Referred for: Dementia; increase physical/cognitive stimulation and reality orientation during hospitalization.    Session interventions & outcomes: Pt was lying down in bed in drowsy state when approached for music tx services.  Pt verbally accepted services at this time. Therapist provided age-appropriate music to increase cognitive stimulation and reality orientation through music interventions.  Pt demonstrated passive participation through receptive music listening and appeared to transition into a sleep state AEB pt closed her eyes, elongated breathing pattern, and observed vocal snore while mouth ajar during interventions. Near the end of the session, pt opened her eyes and verbalized (incoherent) thoughts to the therapist.  Pt smiled and remained in an alert, calm state at the end of the session.    Follow up: Therapist will follow up with pt on Friday if pt remains hospitalized. Therapist onsite on [address] on Mondays ([id number]) and Fridays ([phone]).    Alysa Khalil MA, MT-BC  Medical Music Therapy Services  [email or web address]

## 2017-10-31 ENCOUNTER — APPOINTMENT (OUTPATIENT)
Dept: SPEECH THERAPY | Facility: CLINIC | Age: 72
DRG: 057 | End: 2017-10-31
Payer: MEDICARE

## 2017-10-31 PROCEDURE — A9270 NON-COVERED ITEM OR SERVICE: HCPCS | Mod: GY | Performed by: PHYSICIAN ASSISTANT

## 2017-10-31 PROCEDURE — 25000132 ZZH RX MED GY IP 250 OP 250 PS 637: Mod: GY | Performed by: INTERNAL MEDICINE

## 2017-10-31 PROCEDURE — 25000132 ZZH RX MED GY IP 250 OP 250 PS 637: Mod: GY | Performed by: PHYSICIAN ASSISTANT

## 2017-10-31 PROCEDURE — 99233 SBSQ HOSP IP/OBS HIGH 50: CPT | Performed by: INTERNAL MEDICINE

## 2017-10-31 PROCEDURE — 92526 ORAL FUNCTION THERAPY: CPT | Mod: GN | Performed by: SPEECH-LANGUAGE PATHOLOGIST

## 2017-10-31 PROCEDURE — 99207 ZZC CDG-MDM COMPONENT: MEETS MODERATE - UP CODED: CPT | Performed by: INTERNAL MEDICINE

## 2017-10-31 PROCEDURE — 40000225 ZZH STATISTIC SLP WARD VISIT: Performed by: SPEECH-LANGUAGE PATHOLOGIST

## 2017-10-31 PROCEDURE — 12000000 ZZH R&B MED SURG/OB

## 2017-10-31 PROCEDURE — A9270 NON-COVERED ITEM OR SERVICE: HCPCS | Mod: GY | Performed by: INTERNAL MEDICINE

## 2017-10-31 RX ADMIN — MEMANTINE 10 MG: 5 TABLET ORAL at 08:22

## 2017-10-31 RX ADMIN — CARBIDOPA AND LEVODOPA 1 TABLET: 25; 100 TABLET, ORALLY DISINTEGRATING ORAL at 13:33

## 2017-10-31 RX ADMIN — CARBIDOPA AND LEVODOPA 1 TABLET: 25; 100 TABLET, ORALLY DISINTEGRATING ORAL at 08:22

## 2017-10-31 RX ADMIN — PROPRANOLOL HYDROCHLORIDE 10 MG: 10 TABLET ORAL at 19:58

## 2017-10-31 RX ADMIN — LISINOPRIL 10 MG: 10 TABLET ORAL at 08:22

## 2017-10-31 RX ADMIN — CARBIDOPA AND LEVODOPA 1 TABLET: 25; 100 TABLET, ORALLY DISINTEGRATING ORAL at 19:58

## 2017-10-31 RX ADMIN — RIVASTIGMINE 1 PATCH: 9.5 PATCH, EXTENDED RELEASE TRANSDERMAL at 08:22

## 2017-10-31 RX ADMIN — PROPRANOLOL HYDROCHLORIDE 10 MG: 10 TABLET ORAL at 08:22

## 2017-10-31 RX ADMIN — MEMANTINE 10 MG: 5 TABLET ORAL at 19:57

## 2017-10-31 ASSESSMENT — ACTIVITIES OF DAILY LIVING (ADL)
ADLS_ACUITY_SCORE: 19
ADLS_ACUITY_SCORE: 21
ADLS_ACUITY_SCORE: 19
ADLS_ACUITY_SCORE: 19
ADLS_ACUITY_SCORE: 21
ADLS_ACUITY_SCORE: 21

## 2017-10-31 NOTE — PLAN OF CARE
Problem: SLP General Care Plan  Goal: Swallow Goal: Safely tolerate diet without aspiration (SLP)  Safely tolerate diet without signs/symptoms of aspiration  Discharge Planner SLP   Patient plan for discharge: TCU pending  Current status: Patient tolerating pureed textures. Outward s/sx of aspiration with nectar thick liquids via cup due to poor bolus control and difficulty controlling bolus size. Changed to liquids via spoon which resulted in no outward s/sx of aspiration. Honey-thick liquids tolerated via spoon. Not ready to upgrade liquids at this time.  Barriers to return to prior living situation: medical status; dysphagia  Recommendations for discharge: continued SLP to ensure diet tolerance and ability to advance textures  Rationale for recommendations: patient not at baseline diet       Entered by: Marquis Pizarro 10/31/2017 11:44 AM

## 2017-10-31 NOTE — PLAN OF CARE
Problem: Patient Care Overview  Goal: Plan of Care/Patient Progress Review  OT: Orders received, eval attempted. Pt hospitalized following fall at home- noted hx of multiple falls with facial bruising and R lower arm cast for fx. Pt awake, not verbal-mumbles, pt unable to follow 4/5 verbal/visual/hand over hand commands for simple actions/ADLs- was able to stick out tongue when verbally asked. Per chart and discussion with nursing, pt was IND with mobility at home- unsure of ADLs. Spouse was looking into [facility] prior to hospitalization, TCU vs LTC/MC at d/c. Unable to complete eval at this time, will reschedule as pt has been able to participate with PT.

## 2017-10-31 NOTE — PROGRESS NOTES
CM: [facility] [place] [place]   Ph [phone]. RN Karlos Meza will set up the home care support pt needs at dc. Will need home care PT.OT.SPeech with Medicare face to face  P  WIll need to fax final DC order tomorrow to [phone]  This will be a DC to home with home care support.   Transport set up for 10:00 am

## 2017-10-31 NOTE — PROGRESS NOTES
Hospitalist Progress Note([facility])  Alfredo Maier MD 10/31/2017      Reason for Stay / current hospital problem list:    Frequent falls -mechanical     Advancing dementia and parkinson's disease             Assessment and Plan:          Summary of Stay: Roxanna Castorena is a 72 year old female admitted on 10/26/2017 with from home (in [place] with her ) for recurrent mechanical falls and overall functional decline in the last 2 wks     Problem List:   1.  Mechanical falls, recurrent  She has been living with  and has had many falls at home---more so in the last few weeks  Currently she has a right lower arm cast on for fracture (unsure details) and facial bruising  Home safety is a concern  -- PT/OT assessment and safe discharge planning   -- at risk of fall and injury and needs close monitoring      2.  Advancing dementia and parkinson's disease  -- has tremors    -- continue parkinson's medications   --CT of the head without any acute abnormality---just atrophy.   reports that she is minimally verbal at baseline - occasionally mumbles.     3.  Swallowing difficulty  -- Speech consulted and recommendation noted   -- monitor     4. Fever  -- afebrile now   -- cxr negative for pneumonia   -- no UTI   -- monitor for now          5.  HTN  -- resume home lisinopril with parameter            FEN:    >Fluids: Intravenous fluids were administered  >Electrolytes:ok    >Nutrition:   Active Diet Order      Combination Diet Dysphagia Diet Level 1: Pureed; Honey Thickened Liquids (pre-thickened or use instant food thickener) (by spoon only)      INTRAVENOUS ACCESS:    >Peripheral IV  >Central line: No central line present            Flores/Drain/Tubes:    >Flores catheter:None     >Drains : None    >Feeding tube: None    DVT Prophylaxis:  -  PCD's in place                         Code Status: DNR / DNI  Discharge Dispo: long term care     Estimated Disch Date / # of  "Days until Discharge:  TCU vs LTC facility in next one day         Interval History (Subjective):             Patient was seen and examined by me today and medical record reviewed.Overnight events noted and care discussed with nursing staff and treatment team.  No new compliant   Has no fever   No complaint of fever            Physical Exam:      Last Vital Signs:  /57 (BP Location: Left arm)  Pulse 81  Temp 96.6  F (35.9  C) (Oral)  Resp 16  Ht 1.676 m (5' 6\")  Wt 50.2 kg (110 lb 11.2 oz)  SpO2 99%  BMI 17.87 kg/m2    GEN:  Alert, comfortable, NAD.  HEENT:  Normocephalic/atraumatic, PERRL, no scleral icterus, no nasal discharge, mouth moist, no oral ulcers or thrush noted.  NECK:  No clear thyromegaly of clear JVD  CV:  Regular rate and rhythm, no murmur to ausc.  S1 + S2 noted, no S3 or S4.  LUNGS:  Clear to auscultation bilaterally.  No rales/rhonchi/wheezing auscultated bilaterally.  No costal retractions bilaterally.  Symmetric chest rise on inhalation noted.  ABD:  Active bowel sounds, soft, non-tender/non-distended.  No rebound/guarding/rigidity.  No masses palpated.  No obvious HSM to exam.  EXT:  No edema or cyanosis bilaterally. No joint synovitis noted.  No calf-tenderness or asymmetry noted.  SKIN:  Dry to touch, no rashes or jaundice noted.  PSYCH:  Mood appropriate, Not tearful or depressed.  Maintains direct eye contact.  NEURO:  No tremors at rest           Medications:      All current medications were reviewed with changes reflected in problem list.         Data:        Wt Readings from Last 5 Encounters:   10/26/17 50.2 kg (110 lb 11.2 oz)   10/10/17 51.7 kg (114 lb)   08/03/17 53.5 kg (118 lb)   07/28/17 51.3 kg (113 lb 1.6 oz)   07/17/17 53.5 kg (118 lb)       I/O last 3 completed shifts:  In: 150 [P.O.:150]  Out: -     All laboratory and imaging data in the past 24 hours reviewed       Recent Labs  Lab 10/28/17  0657 10/27/17  1008 10/26/17  1757   WBC 6.0 7.1 10.7   HGB  --  9.8* " 11.2*   HCT  --  29.4* 33.0*   MCV  --  105* 102*   PLT  --  112* 142*     No results for input(s): CULT in the last 168 hours.    Recent Labs  Lab 10/28/17  0657 10/27/17  1008 10/26/17  1757    139 138   POTASSIUM 4.8 3.8 3.9   CHLORIDE 108 106 105   CO2 25 28 29   ANIONGAP 5 5 4   GLC 91 99 121*   BUN 14 19 24   CR 0.99 1.07* 0.82   GFRESTIMATED 55* 50* 69   GFRESTBLACK 66 61 83   PRINCESS 8.4* 8.5 8.7   PROTTOTAL  --  6.0*  --    ALBUMIN  --  2.9*  --    BILITOTAL  --  0.9  --    ALKPHOS  --  73  --    AST  --  23  --    ALT  --  36  --          Recent Labs  Lab 10/28/17  0657 10/27/17  1008 10/26/17  1757   GLC 91 99 121*           No results for input(s): INR in the last 168 hours.      No results for input(s): TROPONIN, TROPI, TROPR in the last 168 hours.    Invalid input(s): TROP, TROPONINIES    Recent Results (from the past 48 hour(s))   XR Pelvis and Hip Left 2 Views    Narrative    PELVIS WITH LEFT HIP TWO VIEWS  10/26/2017 5:14 PM     HISTORY: Fall, left hip pain.    COMPARISON: None.      Impression    IMPRESSION:  No evidence of left hip fracture. Right femur is not well  profiled probably due to flexion, and it would be difficult to rule  out fracture in the right femur.    STANTON BURGOS MD   CT Head w/o Contrast    Narrative    CT SCAN OF THE HEAD WITHOUT CONTRAST  10/26/2017  5:18 PM     HISTORY: Fall, head trauma. Alzheimer disease and Parkinson's.    TECHNIQUE: Axial images of the head and coronal reformations without  IV contrast material. Radiation dose for this scan was reduced using  automated exposure control, adjustment of the mA and/or kV according  to patient size, or iterative reconstruction technique.    COMPARISON: 7/28/2017    FINDINGS: There is generalized atrophy of the brain. There is no  evidence of intracranial hemorrhage, mass, acute infarct or anomaly.     The visualized portions of the sinuses and mastoids appear normal.  There is no evidence of trauma.       Impression     IMPRESSION:   1. No acute abnormality.  2. Generalized atrophy of the brain.    STANTON BURGOS MD   XR Chest Port 1 View    Narrative    XR CHEST PORT 1 VW 10/27/2017 10:45 AM    HISTORY: Fever. Assess for pneumonia.    COMPARISON: None.      Impression    IMPRESSION: A single view of the chest shows no acute or active  cardiopulmonary disease.     LIANE PALUMBO MD                 Disclaimer: This note consists of symbols derived from keyboarding, dictation and/or voice recognition software. As a result, there may be errors in the script that have gone undetected. Please consider this when interpreting information found in this chart

## 2017-10-31 NOTE — PLAN OF CARE
Problem: Patient Care Overview  Goal: Individualization & Mutuality  Outcome: No Change  Agitated at times, repositioned and attempted to get Pt up out of bed but became combative. Inc of urine and stool. Cares provided with 2 assist. Total feed 50% of meal. Disoriented x 4.

## 2017-10-31 NOTE — PROGRESS NOTES
CM: Spoke with spouse. He is agreeable to having referrals sent to local facilities for TCU if we are not able to get the two facilities in [facility] to accept.

## 2017-10-31 NOTE — PLAN OF CARE
Problem: Patient Care Overview  Goal: Plan of Care/Patient Progress Review  Outcome: No Change  Patient remains hospitalized following fall at home. Remains disoriented x4, while remaining mostly lethargic throughout the day. Awakes at times, but continues to be unable to follow commands. Tolerating DD1 + honey thick liquids - requiring total feed assistance. Eating 25-50% of meals. VSS. Not out of bed this shift due to lethargy. Alarms remain on for safety.

## 2017-10-31 NOTE — PLAN OF CARE
Problem: Patient Care Overview  Goal: Plan of Care/Patient Progress Review  Patient's VS stable, afebrile, denies pain and sleeping comfortably through shift, only woke to painful stimuli and back to sleep-very lethargic, disoriented x4. Patient with bed alarms on and without attempts for self-transfer.NDD1 diet with honey thick liquids, assist of 2 for mobility without attempts during OVN, PT involved.

## 2017-11-01 VITALS
TEMPERATURE: 97.5 F | RESPIRATION RATE: 18 BRPM | OXYGEN SATURATION: 96 % | WEIGHT: 110.7 LBS | HEIGHT: 66 IN | DIASTOLIC BLOOD PRESSURE: 70 MMHG | BODY MASS INDEX: 17.79 KG/M2 | HEART RATE: 81 BPM | SYSTOLIC BLOOD PRESSURE: 161 MMHG

## 2017-11-01 LAB
ALBUMIN UR-MCNC: NEGATIVE MG/DL
APPEARANCE UR: CLEAR
BILIRUB UR QL STRIP: NEGATIVE
COLOR UR AUTO: YELLOW
GLUCOSE UR STRIP-MCNC: NEGATIVE MG/DL
HGB UR QL STRIP: ABNORMAL
HYALINE CASTS #/AREA URNS LPF: 1 /LPF (ref 0–2)
KETONES UR STRIP-MCNC: NEGATIVE MG/DL
LEUKOCYTE ESTERASE UR QL STRIP: NEGATIVE
MUCOUS THREADS #/AREA URNS LPF: PRESENT /LPF
NITRATE UR QL: NEGATIVE
PH UR STRIP: 6 PH (ref 5–7)
RBC #/AREA URNS AUTO: 2 /HPF (ref 0–2)
SOURCE: ABNORMAL
SP GR UR STRIP: 1.02 (ref 1–1.03)
SQUAMOUS #/AREA URNS AUTO: <1 /HPF (ref 0–1)
UROBILINOGEN UR STRIP-MCNC: 2 MG/DL (ref 0–2)
WBC #/AREA URNS AUTO: 1 /HPF (ref 0–2)

## 2017-11-01 PROCEDURE — 25000132 ZZH RX MED GY IP 250 OP 250 PS 637: Mod: GY | Performed by: INTERNAL MEDICINE

## 2017-11-01 PROCEDURE — A9270 NON-COVERED ITEM OR SERVICE: HCPCS | Mod: GY | Performed by: INTERNAL MEDICINE

## 2017-11-01 PROCEDURE — A9270 NON-COVERED ITEM OR SERVICE: HCPCS | Mod: GY | Performed by: PHYSICIAN ASSISTANT

## 2017-11-01 PROCEDURE — 25000128 H RX IP 250 OP 636: Performed by: INTERNAL MEDICINE

## 2017-11-01 PROCEDURE — 81001 URINALYSIS AUTO W/SCOPE: CPT | Performed by: INTERNAL MEDICINE

## 2017-11-01 PROCEDURE — 99239 HOSP IP/OBS DSCHRG MGMT >30: CPT | Performed by: INTERNAL MEDICINE

## 2017-11-01 PROCEDURE — 90662 IIV NO PRSV INCREASED AG IM: CPT | Performed by: INTERNAL MEDICINE

## 2017-11-01 PROCEDURE — 25000132 ZZH RX MED GY IP 250 OP 250 PS 637: Mod: GY | Performed by: PHYSICIAN ASSISTANT

## 2017-11-01 RX ADMIN — PROPRANOLOL HYDROCHLORIDE 10 MG: 10 TABLET ORAL at 08:28

## 2017-11-01 RX ADMIN — RIVASTIGMINE 1 PATCH: 9.5 PATCH, EXTENDED RELEASE TRANSDERMAL at 08:36

## 2017-11-01 RX ADMIN — MEMANTINE 10 MG: 5 TABLET ORAL at 08:28

## 2017-11-01 RX ADMIN — LISINOPRIL 10 MG: 10 TABLET ORAL at 08:36

## 2017-11-01 RX ADMIN — INFLUENZA A VIRUSA/MICHIGAN/45/2015 X-275 (H1N1) ANTIGEN (FORMALDEHYDE INACTIVATED), INFLUENZA A VIRUS A/HONG KONG/4801/2014 X-263B (H3N2) ANTIGEN (FORMALDEHYDE INACTIVATED), AND INFLUENZA B VIRUS B/BRISBANE/60/2008 ANTIGEN (FORMALDEHYDE INACTIVATED) 0.5 ML: 60; 60; 60 INJECTION, SUSPENSION INTRAMUSCULAR at 09:30

## 2017-11-01 RX ADMIN — CARBIDOPA AND LEVODOPA 1 TABLET: 25; 100 TABLET, ORALLY DISINTEGRATING ORAL at 08:36

## 2017-11-01 ASSESSMENT — ACTIVITIES OF DAILY LIVING (ADL)
ADLS_ACUITY_SCORE: 21
ADLS_ACUITY_SCORE: 19
ADLS_ACUITY_SCORE: 19

## 2017-11-01 NOTE — PLAN OF CARE
Problem: Patient Care Overview  Goal: Plan of Care/Patient Progress Review  Speech Language Therapy Discharge Summary     Reason for therapy discharge:    Discharged to transitional care facility.     Progress towards therapy goal(s). See goals on Care Plan in [facility] electronic health record for goal details.  Goals partially met.  Barriers to achieving goals:   discharge from facility.     Therapy recommendation(s):    Continued therapy is recommended.  Rationale/Recommendations:  Continued ST for dysphagia to assess diet tolerance and trial advanced textures as appropriate.      Recommend dysphagia diet level 1 with honey thick liquids when alert.

## 2017-11-01 NOTE — PLAN OF CARE
Problem: Patient Care Overview  Goal: Plan of Care/Patient Progress Review  Outcome: Improving  Patient up with assist of 2 gait belt pivot to chair with alarm in place.  DD1 Honey thick liquids, total feed.  Disoriented x4.  Shows no non-verbal indicators of pain.  Bed alarm activated. Patient due to void, bladder scan shows 381, no orders to straight cath, NOC RN updated and plan to page hospitalist.

## 2017-11-01 NOTE — PLAN OF CARE
Problem: Patient Care Overview  Goal: Plan of Care/Patient Progress Review  Patient's VS are stable, afebrile, denies pain and shows no signs of discomfort, alert and conversant but illogical and unable to follow conversation-disoriented x4, assist of 2 for mobility-not attempted this shift, bed alarm active on zone 2. Patient noted not to void during evening shift with little incontinence over past 24hr-bladder scan indicated 384mL, order received and straight cathed for 425mL of concentrated, odorous yellow urine very slow to eliminate-sample sent to lab with clean results, UC pending. PIV locked NDD1 diet with honey-thickened liquids, pleasant without behaviors through shift, intermittent awake/sleep through shift but no attempts to exit bed. Expected to discharged today to [facility] in [facility] with transport set for 1000.

## 2017-11-01 NOTE — PLAN OF CARE
Problem: Patient Care Overview  Goal: Plan of Care/Patient Progress Review  Outcome: Adequate for Discharge Date Met:  11/01/17  Patient hospitalized following fall at home. Cleared for discharge to facility this AM. Discharge instructions, medications, and follow-ups reviewed with patient's  in detail. All questions and concerns were addressed with patient's . [facility] providing transport.

## 2017-11-01 NOTE — PLAN OF CARE
Problem: Patient Care Overview  Goal: Individualization & Mutuality  PT - Pt discharged to home with [facility] - home PT/OT/Speech with goals not met.  Please refer to discharge summary.   Pt needing modA x1-2 (second didn't assist much) supine<sit, pt definitely understood and initiated sitting from supine with verbal cues. Not met  Sit<>stand with mod-max A x2. Not met   pt taking very short shuffling steps to chair 2 ft away from bed. Not met

## 2017-11-02 ENCOUNTER — TELEPHONE (OUTPATIENT)
Dept: INTERNAL MEDICINE | Facility: CLINIC | Age: 72
End: 2017-11-02

## 2017-11-02 NOTE — TELEPHONE ENCOUNTER
Pt is in assisted living and memory care in [facility] [place] per S. O. . This way niece could be close to her . Pt has had 6 falls recently and needed rehab but no TCU in [facility]. Pt may end up needing LTC per spouse he does not know that he can care for her in the home any longer . RN encouraged pt spouse to call back with questions for Care coordination if needed .Beatriz Diaz RN

## 2017-11-02 NOTE — TELEPHONE ENCOUNTER
"ED/Discharge Protocol    \"Hi, my name is Elena Diaz, a registered nurse, and I am calling on behalf of Dr. Jeffrey's office at [place].  I am calling to follow up and see how things are going for you after your recent visit.\"    \"I see that you were in the (ER/UC/IP) on 10/26/2017.    How are you doing now that you are home?\" PT not available RN spoke with Spouse - see below telephone message    Is patient experiencing symptoms that may require a hospital visit?  no in Assisted living     Discharge Instructions    \"Let's review your discharge instructions.  What is/are the follow-up recommendations?  Pt. Response: continue rehab at Assisted living since no TCU in [facility]    \"Were you instructed to make a follow-up appointment?\"  Pt. Response: No.       \"When you see the provider, I would recommend that you bring your discharge instructions with you.    Medications    \"How many new medications are you on since your hospitalization/ED visit?\"    0-1  \"How many of your current medicines changed (dose, timing, name, etc.) while you were in the hospital/ED visit?\"   0-1  \"Do you have questions about your medications?\"   No  \"Were you newly diagnosed with heart failure, COPD, diabetes or did you have a heart attack?\"   No  For patients on insulin: \"Did you start on insulin in the hospital or did you have your insulin dose changed?\"   No    Medication reconciliation completed? Yes    Was MTM referral placed (*Make sure to put transitions as reason for referral)?   No    Call Summary    \"Do you have any questions or concerns about your condition or care plan at the moment?\"    No  Triage nurse advice given: Call Care coordination if needing assistance     Patient was in ER 2 times in the past year (assess appropriateness of ER visits.)      \"If you have questions or things don't continue to improve, we encourage you contact us through the main clinic number,  [phone].  Even if the clinic is not open, triage " "nurses are available 24/7 to help you.     We would like you to know that our clinic has extended hours (provide information).  We also have urgent care (provide details on closest location and hours/contact info)\"      \"Thank you for your time and take care!\"      "

## 2017-11-03 NOTE — DISCHARGE SUMMARY
Physician Discharge Summary     Name: Roxanna Castorena    MRN: [id number]     YOB: 1945    Age: 72 year old                                                 Primary care provider: Lui Jeffrey      Admit date:  10/26/2017      Discharge date and time: 11/1/2017 10:06 AM       Discharge Physician:  Alfredo Maier        Discharge Diagnosis and brief summary        #1. Mechanical falls, recurrent    #2. Advancing dementia and parkinson's disease    #3. 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 Gets nausea with 10mg tab     DEVIATED SEPTUM/NASAL CONGESTION      Dysphagia 10/31/2013     HTN (hypertension) 7/5/2012     LEFT BREAST TENDERNESS 7/02    Negative U/S     MIGRAINE HEADACHE 5/98     Monoclonal gammopathy      NASAL TIP SKIN LESION 5/03    Actinic keratosis     Osteopenia 10/31/2013     Other chronic pain      Parkinsons disease (H)      Synovitis and tenosynovitis, unspecified 4/03    Bilateral thumbs, s/p cortisone injections     Tremor 10/31/2013     Trigeminal neuralgia 8/00    Negative MRI Head     Unspecified cerebral artery occlusion with cerebral infarction     hx tia's yrs ago       Past Surgical History:    Past Surgical History:   Procedure Laterality Date     BONE MARROW BIOPSY, BONE SPECIMEN, NEEDLE/TROCAR Right 10/9/2014    Procedure: BIOPSY BONE MARROW;  Surgeon: Lokesh Malik MD;  Location: [facility] OR     C NONSPECIFIC PROCEDURE  1979    SILVANA/BSO     C NONSPECIFIC PROCEDURE  1964    cholecystectomy     C NONSPECIFIC PROCEDURE  1951    appendectomy     HC UGI ENDOSCOPY, SIMPLE EXAM  09/26/13    [facility]  "                  Brief Summary of Hospital stay :       Please refer to  Admission H&P note for full details of patient presentation.      Admission Condition: poor     Discharged Condition: stable    /70 (BP Location: Left arm)  Pulse 81  Temp 97.5  F (36.4  C) (Axillary)  Resp 18  Ht 1.676 m (5' 6\")  Wt 50.2 kg (110 lb 11.2 oz)  SpO2 96%  BMI 17.87 kg/m2       Presenting problem/signs and symptoms:     falls     Brief Hospital Summary:      Roxanna Castorena is a 72 year old female admitted on 10/26/2017 with from home (in [place] with her ) for recurrent mechanical falls and overall functional decline in the last 2 wks      Problem List:   1.  Mechanical falls, recurrent  She has been living with  and has had many falls at home---more so in the last few weeks  Currently she has a right lower arm cast on for fracture (unsure details) and facial bruising  Home safety was  a concern  -- PT/OT assessment and safe discharge planning done   -- at risk of fall and injury and needs close monitoring   -- sent to SNF       2.  Advancing dementia and parkinson's disease  -- had tremors    -- continue parkinson's medications   --CT of the head without any acute abnormality---just atrophy.   reports that she is minimally verbal at baseline - occasionally mumbles.      3.  Swallowing difficulty  -- Speech consulted and recommendation noted for modified   -     4. Fever  -- afebrile    -- cxr negative for pneumonia   -- no UTI               5.  HTN  -- continue home lisinopril               Consultations during hospital stay         SOCIAL WORK IP CONSULT  PHYSICAL THERAPY ADULT IP CONSULT  OCCUPATIONAL THERAPY ADULT IP CONSULT  SPEECH LANGUAGE PATH ADULT IP CONSULT  SWALLOW EVAL SPEECH PATH AT BEDSIDE IP CONSULT  PALLIATIVE CARE ADULT IP CONSULT  PHYSICAL THERAPY ADULT IP CONSULT  SOCIAL WORK IP CONSULT  CARE COORDINATOR IP CONSULT  OCCUPATIONAL THERAPY ADULT IP CONSULT  SOCIAL WORK IP " CONSULT      Major procedure performed/  Significant Diagnostic Studies              Results for orders placed or performed during the hospital encounter of 10/26/17   CT Head w/o Contrast    Narrative    CT SCAN OF THE HEAD WITHOUT CONTRAST  10/26/2017  5:18 PM     HISTORY: Fall, head trauma. Alzheimer disease and Parkinson's.    TECHNIQUE: Axial images of the head and coronal reformations without  IV contrast material. Radiation dose for this scan was reduced using  automated exposure control, adjustment of the mA and/or kV according  to patient size, or iterative reconstruction technique.    COMPARISON: 7/28/2017    FINDINGS: There is generalized atrophy of the brain. There is no  evidence of intracranial hemorrhage, mass, acute infarct or anomaly.     The visualized portions of the sinuses and mastoids appear normal.  There is no evidence of trauma.       Impression    IMPRESSION:   1. No acute abnormality.  2. Generalized atrophy of the brain.    STANTON BURGOS MD   XR Pelvis and Hip Left 2 Views    Narrative    PELVIS WITH LEFT HIP TWO VIEWS  10/26/2017 5:14 PM     HISTORY: Fall, left hip pain.    COMPARISON: None.      Impression    IMPRESSION:  No evidence of left hip fracture. Right femur is not well  profiled probably due to flexion, and it would be difficult to rule  out fracture in the right femur.    STANTON BURGOS MD   XR Chest Port 1 View    Narrative    XR CHEST PORT 1 VW 10/27/2017 10:45 AM    HISTORY: Fever. Assess for pneumonia.    COMPARISON: None.      Impression    IMPRESSION: A single view of the chest shows no acute or active  cardiopulmonary disease.     LIANE PALUMBO MD         Recent Labs  Lab 10/28/17  0657 10/27/17  1008   WBC 6.0 7.1   HGB  --  9.8*   HCT  --  29.4*   MCV  --  105*   PLT  --  112*     No results for input(s): CULT in the last 168 hours.    Recent Labs  Lab 10/28/17  0657 10/27/17  1008    139   POTASSIUM 4.8 3.8   CHLORIDE 108 106   CO2 25 28   ANIONGAP 5 5   GLC  91 99   BUN 14 19   CR 0.99 1.07*   GFRESTIMATED 55* 50*   GFRESTBLACK 66 61   PRINCESS 8.4* 8.5   PROTTOTAL  --  6.0*   ALBUMIN  --  2.9*   BILITOTAL  --  0.9   ALKPHOS  --  73   AST  --  23   ALT  --  36         Recent Labs  Lab 10/28/17  0657 10/27/17  1008   GLC 91 99           No results for input(s): INR in the last 168 hours.            Pending Results           Unresulted Labs Ordered in the Past 30 Days of this Admission     No orders found from 8/27/2017 to 10/27/2017.              Disposition         SNF      Allergies       Allergies   Allergen Reactions     Donepezil      Hot flashes     Lyrica [Pregabalin] Hives            Patient Instructions and Discharge Medications              Review of your medicines      CONTINUE these medicines which have NOT CHANGED       Dose / Directions    carbidopa-levodopa  MG per tablet   Commonly known as:  SINEMET        Dose:  1 tablet   Take 1 tablet by mouth 3 times daily At 0700, 1300, and 1800   Refills:  0       EXELON 9.5 MG/24HR 24 hr patch   Generic drug:  rivastigmine        Dose:  1 patch   Place 1 patch onto the skin daily   Refills:  0       gabapentin 100 MG capsule   Commonly known as:  NEURONTIN   Used for:  Trigeminal neuralgia        Dose:  100 mg   Take 1 capsule (100 mg) by mouth 3 times daily As needed for  Facial nerve pain   Quantity:  90 capsule   Refills:  11       lisinopril 10 MG tablet   Commonly known as:  PRINIVIL/ZESTRIL   Used for:  Benign essential hypertension        Dose:  10 mg   Take 1 tablet (10 mg) by mouth daily   Quantity:  90 tablet   Refills:  3       memantine 10 MG tablet   Commonly known as:  NAMENDA   Used for:  Dementia without behavioral disturbance, unspecified dementia type        Dose:  10 mg   Take 1 tablet (10 mg) by mouth 2 times daily   Quantity:  180 tablet   Refills:  3       propranolol 10 MG tablet   Commonly known as:  INDERAL   Used for:  Tremor        Dose:  10 mg   Take 1 tablet (10 mg) by mouth 2 times  daily   Quantity:  180 tablet   Refills:  3              Discharge diet: Combination Diet Dysphagia Diet Level 1: Pureed; Honey Thickened Liquids (pre-thickened or use instant food thickener) (by spoon only)         Discharge activity:Activity as tolerated      Discharge follow-up:    Follow up with primary care provider in 7 days or earlier if symptoms return or gets worse.    Follow up with consultant as instructed      Other instructions:    We discussed with Patient/family about detail discharge instructions as well as discharge medications above including potential risks,side effects and benefits.Patient/family understood benefits and potential serious side effects of taking these medications and need to follow up with PCP if the patient develops complications.  Patient is also advised to see a doctor immediately for severe symptoms.          I saw and evaluated the patient on day of discharge and  discharge instructions reviewed  and  all the patient's questions and concerns addressed.Over 30 minutes spent on discharge and coordination of discharge process for this patient.          Disclaimer: This note consists of symbols derived from keyboarding, dictation and/or voice recognition software. As a result, there may be errors in the script that have gone undetected. Please consider this when interpreting information found in this chart

## 2017-11-07 ENCOUNTER — MEDICAL CORRESPONDENCE (OUTPATIENT)
Dept: HEALTH INFORMATION MANAGEMENT | Facility: CLINIC | Age: 72
End: 2017-11-07

## 2017-11-09 DIAGNOSIS — R25.1 TREMOR: ICD-10-CM

## 2017-11-09 NOTE — TELEPHONE ENCOUNTER
Routing refill request to provider for review/approval because:  out of range:  Blood pressure

## 2017-11-10 RX ORDER — PROPRANOLOL HYDROCHLORIDE 10 MG/1
TABLET ORAL
Qty: 180 TABLET | Refills: 1 | Status: SHIPPED | OUTPATIENT
Start: 2017-11-10

## 2017-11-13 ENCOUNTER — MEDICAL CORRESPONDENCE (OUTPATIENT)
Dept: HEALTH INFORMATION MANAGEMENT | Facility: CLINIC | Age: 72
End: 2017-11-13

## 2017-11-13 DIAGNOSIS — Z53.9 DIAGNOSIS NOT YET DEFINED: Primary | ICD-10-CM

## 2017-11-13 PROCEDURE — G0180 MD CERTIFICATION HHA PATIENT: HCPCS | Performed by: INTERNAL MEDICINE

## 2017-11-17 ENCOUNTER — MEDICAL CORRESPONDENCE (OUTPATIENT)
Dept: HEALTH INFORMATION MANAGEMENT | Facility: CLINIC | Age: 72
End: 2017-11-17

## 2017-12-04 ENCOUNTER — MEDICAL CORRESPONDENCE (OUTPATIENT)
Dept: HEALTH INFORMATION MANAGEMENT | Facility: CLINIC | Age: 72
End: 2017-12-04

## 2017-12-07 ENCOUNTER — MEDICAL CORRESPONDENCE (OUTPATIENT)
Dept: HEALTH INFORMATION MANAGEMENT | Facility: CLINIC | Age: 72
End: 2017-12-07

## 2017-12-18 ENCOUNTER — MEDICAL CORRESPONDENCE (OUTPATIENT)
Dept: HEALTH INFORMATION MANAGEMENT | Facility: CLINIC | Age: 72
End: 2017-12-18

## 2017-12-22 ENCOUNTER — MEDICAL CORRESPONDENCE (OUTPATIENT)
Dept: HEALTH INFORMATION MANAGEMENT | Facility: CLINIC | Age: 72
End: 2017-12-22

## 2018-01-15 ENCOUNTER — TELEPHONE (OUTPATIENT)
Dept: ONCOLOGY | Facility: CLINIC | Age: 73
End: 2018-01-15

## 2018-01-15 NOTE — TELEPHONE ENCOUNTER
Roxanna's  Chon called to inform us that Roxanna would not be coming to see Dr Villagran today (1/15/18).  She is now residing at [facility].  She is no longer able to walk per .  The number at this facility is [phone]. Brooklyn Zimmerman

## 2018-01-17 DIAGNOSIS — Z53.9 DIAGNOSIS NOT YET DEFINED: Primary | ICD-10-CM

## 2018-01-17 PROCEDURE — G0180 MD CERTIFICATION HHA PATIENT: HCPCS | Performed by: INTERNAL MEDICINE

## 2018-01-17 NOTE — TELEPHONE ENCOUNTER
Called and spoke to patient's spouse, Chon.  Patient is in the Memory Unit of [facility].  Chon is not sure what type of f/u patient should have, but states Roxanna is doing well in her new surroundings.  Writer explained that she will send a note to MD for f/u instructions.  Most likely labs and nursing at facility can be arranged for ongoing monitoring.  Chon is aware that patient's most recent tests are stable.  Will have Dr. Villagran connect with him to discuss ongoing POC.  Chon is in agreement with the plan.   Grupo Larios, RN, BSN, OCN  [facility]  Patient Care Coordinator

## 2018-01-19 PROCEDURE — G0179 MD RECERTIFICATION HHA PT: HCPCS | Performed by: INTERNAL MEDICINE

## 2018-01-23 ENCOUNTER — TELEPHONE (OUTPATIENT)
Dept: ONCOLOGY | Facility: CLINIC | Age: 73
End: 2018-01-23

## 2018-01-23 ENCOUNTER — MEDICAL CORRESPONDENCE (OUTPATIENT)
Dept: HEALTH INFORMATION MANAGEMENT | Facility: CLINIC | Age: 73
End: 2018-01-23

## 2018-01-23 NOTE — TELEPHONE ENCOUNTER
Called and spoke with patient's , Chon.  Explained with patient's dementia and difficulty with transporting, it would be better to have Memory Care Unit Provider or Primary Care Provider monitor patient, ongoing.  Chon stated the facility had also discussed this with him and he was in agreement this would be easier.  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  Writer will continue to follow status.  Grupo Larios, RN, BSN, OCN  [facility]  Patient Care Coordinator

## 2018-01-25 ENCOUNTER — MEDICAL CORRESPONDENCE (OUTPATIENT)
Dept: HEALTH INFORMATION MANAGEMENT | Facility: CLINIC | Age: 73
End: 2018-01-25

## 2018-09-15 ENCOUNTER — HEALTH MAINTENANCE LETTER (OUTPATIENT)
Age: 73
End: 2018-09-15

## 2019-12-18 ENCOUNTER — DOCUMENTATION ONLY (OUTPATIENT)
Dept: OTHER | Facility: CLINIC | Age: 74
End: 2019-12-18

## 2022-12-20 NOTE — IP AVS SNAPSHOT
[facility] Observation Department    [address]    [facility] [address]    Phone:  [phone]                                       After Visit Summary   7/28/2017    Roxanna Castorena    MRN: [id number]           After Visit Summary Signature Page     I have received my discharge instructions, and my questions have been answered. I have discussed any challenges I see with this plan with the nurse or doctor.    ..........................................................................................................................................  Patient/Patient Representative Signature      ..........................................................................................................................................  Patient Representative Print Name and Relationship to Patient    ..................................................               ................................................  Date                                            Time    ..........................................................................................................................................  Reviewed by Signature/Title    ...................................................              ..............................................  Date                                                            Time           Most Recent JAECY 7 Score       Date JACEY 7 Score   12/20/2022 0       Recent PHQ 2/9 Score    PHQ 2:  Date Adult PHQ 2 Score Adult PHQ 2 Interpretation   12/20/2022 0 No further screening needed       PHQ 9:  Date Adult PHQ 9 Score Adult PHQ 9 Interpretation   12/20/2022 3 Minimal Depression       [place] Suicide Severity Rating Scale  1. Have you wished you were dead or wished you could go to sleep and not wake up? (past month): No (12/20/22 1439)  2. Have you actually had any thoughts of killing yourself? (past month): No (12/20/22 1439)  6. Have you ever done anything, started to do anything, or prepared to do anything to end your life? (lifetime): No (12/20/22 1439)  Suicide Evaluation: Negative Screen - White (12/20/22 1439)    HRA  6 b.) How many servings of High Fiber / Whole Grain Foods to you have each day ( 1 serving = 1 cup cold cereal, 1/2 cup cooked cereal, 1 slice bread): 1 per day     11b.) Bowel control problems: Sometimes     11i.) Problems using the telephone: Sometimes     11l.) Sexual Problems: Always     15.) How confident are you that you can control and manage most of your health problems?: Somewhat confident         6b) Patient is encouraged to increase consumption of whole grain/high fiber food if able.    11b) Patient reports that he has constipation, uses OTC to help    11I) Patient reports his wife says he doesn't hear her, he reports he should get his hearing tested     11i) Patient will discuss with PCP     15) Patient reports with just aging in general     HMD  Health Maintenance Due   Topic Date Due   • Hepatitis B Vaccine (For Physician/APC Discussion) (1 of 3 - 3-dose series) 1940   • Shingles Vaccine (1 of 2) Never done   • Traditional Medicare- Medicare Wellness Visit  12/21/2022       Patient is due for topics listed above, he wishes to proceed with Depression Screening  and MWV (Medicare Wellness Visit), but is not proceeding with Immunization(s) Hep B and Shingles at this  time. The following has occurred: Education provided for Immunization(s) Hep B and Shingles.

## 2024-07-16 NOTE — PROGRESS NOTES
Noted. At recent appt,   Did not feel additional services were required and wanted to keep pt at home with him nd was providing 24 hrs care with himself or occ having pt at adult  if he needed to be doing errands on his own.  Pt appeared well cared for by  when recently seen. Therefore cannot force additional care and pt' have been provided tele numbers to call if wishing to have additional resources  
[facility] will be sharing updates with you  for home care services.  This is for care coordination purposes and alert you to referral status.  We received the referral for  Roxanna Castorena; MRN [id number] and want to update you:  Writer has made several attempts to contact spouse to set up homecare assessment visit.   He has not been able to be reached.  Writer has tried at different times of the day.  Writer did speak to him last week on 8\1, and he did not seem interested, stating he did not think it would do any good.  Writer explained that our SW and SN could work together to help him get more help in the home arranged, and what resources\options might be available in the community.  But every day this week writer has not been able to reach him, and they do not have any type of answering machine.    Writer will try through the end of this week, but not sure if we will be able to contact the spouse.  Writer is aware that spouse was given our phone number to call and arrange homecare visits.    Sincerely [facility]  Danisha Man  [phone]  
none